# Patient Record
Sex: FEMALE | Race: WHITE | Employment: OTHER | URBAN - METROPOLITAN AREA
[De-identification: names, ages, dates, MRNs, and addresses within clinical notes are randomized per-mention and may not be internally consistent; named-entity substitution may affect disease eponyms.]

---

## 2017-04-03 ENCOUNTER — HOSPITAL ENCOUNTER (OUTPATIENT)
Dept: BONE DENSITY | Facility: HOSPITAL | Age: 77
Discharge: HOME | End: 2017-04-03
Attending: OBSTETRICS & GYNECOLOGY
Payer: MEDICARE

## 2017-04-03 DIAGNOSIS — M85.88 OSTEOPENIA OF OTHER SITE: ICD-10-CM

## 2017-04-03 PROCEDURE — 77080 DXA BONE DENSITY AXIAL: CPT

## 2017-10-24 ENCOUNTER — HOSPITAL ENCOUNTER (OUTPATIENT)
Dept: MAMMOGRAPHY | Facility: HOSPITAL | Age: 77
Discharge: HOME | End: 2017-10-24
Attending: OBSTETRICS & GYNECOLOGY
Payer: MEDICARE

## 2017-10-24 DIAGNOSIS — Z12.31 ENCOUNTER FOR SCREENING MAMMOGRAM FOR MALIGNANT NEOPLASM OF BREAST: ICD-10-CM

## 2017-10-24 PROCEDURE — 77067 SCR MAMMO BI INCL CAD: CPT | Performed by: OBSTETRICS & GYNECOLOGY

## 2019-07-18 ENCOUNTER — HOSPITAL ENCOUNTER (OUTPATIENT)
Dept: BONE DENSITY | Facility: HOSPITAL | Age: 79
Discharge: HOME | End: 2019-07-18
Attending: OBSTETRICS & GYNECOLOGY
Payer: MEDICARE

## 2019-07-18 ENCOUNTER — HOSPITAL ENCOUNTER (OUTPATIENT)
Dept: MAMMOGRAPHY | Facility: HOSPITAL | Age: 79
Discharge: HOME | End: 2019-07-18
Attending: OBSTETRICS & GYNECOLOGY
Payer: MEDICARE

## 2019-07-18 DIAGNOSIS — Z78.0 ASYMPTOMATIC MENOPAUSAL STATE: ICD-10-CM

## 2019-07-18 DIAGNOSIS — Z12.31 ENCOUNTER FOR SCREENING MAMMOGRAM FOR MALIGNANT NEOPLASM OF BREAST: ICD-10-CM

## 2019-07-18 PROCEDURE — 77067 SCR MAMMO BI INCL CAD: CPT | Performed by: OBSTETRICS & GYNECOLOGY

## 2019-07-18 PROCEDURE — 77063 BREAST TOMOSYNTHESIS BI: CPT | Performed by: OBSTETRICS & GYNECOLOGY

## 2019-07-18 PROCEDURE — 77080 DXA BONE DENSITY AXIAL: CPT | Performed by: OBSTETRICS & GYNECOLOGY

## 2020-01-04 ENCOUNTER — APPOINTMENT (OUTPATIENT)
Dept: GENERAL RADIOLOGY | Facility: HOSPITAL | Age: 80
End: 2020-01-04
Payer: MEDICARE

## 2020-01-04 ENCOUNTER — HOSPITAL ENCOUNTER (EMERGENCY)
Facility: HOSPITAL | Age: 80
Discharge: HOME | End: 2020-01-04
Payer: MEDICARE

## 2020-01-04 VITALS
OXYGEN SATURATION: 99 % | HEIGHT: 65 IN | RESPIRATION RATE: 18 BRPM | HEART RATE: 61 BPM | SYSTOLIC BLOOD PRESSURE: 114 MMHG | TEMPERATURE: 98 F | DIASTOLIC BLOOD PRESSURE: 77 MMHG | WEIGHT: 195 LBS | BODY MASS INDEX: 32.49 KG/M2

## 2020-01-04 DIAGNOSIS — J06.9 UPPER RESPIRATORY TRACT INFECTION, UNSPECIFIED TYPE: Primary | ICD-10-CM

## 2020-01-04 LAB — S PYO AG THROAT QL: NEGATIVE

## 2020-01-04 PROCEDURE — 99283 EMERGENCY DEPT VISIT LOW MDM: CPT

## 2020-01-04 PROCEDURE — 87430 STREP A AG IA: CPT

## 2020-01-04 PROCEDURE — 71046 X-RAY EXAM CHEST 2 VIEWS: CPT

## 2020-01-04 NOTE — ED PROVIDER NOTES
Patient Seen in: [facility] AND [facility] Emergency Department    History   Patient presents with:  Sore Throat    Stated Complaint: sore throat    HPI    79yr old female to the ER with sore throat and coughing that started right around [place].   Patient stat 1442]   BP 99/70   Pulse 72   Resp 16   Temp 98.2 °F (36.8 °C)   Temp src Temporal   SpO2 95 %   O2 Device None (Room air)       Current:/77   Pulse 61   Temp 98.2 °F (36.8 °C) (Temporal)   Resp 18   Ht 165.1 cm (5' 5\")   Wt 88.5 kg   SpO2 99%   BMI

## 2020-09-18 ENCOUNTER — HOSPITAL ENCOUNTER (OUTPATIENT)
Dept: MAMMOGRAPHY | Facility: HOSPITAL | Age: 80
Discharge: HOME | End: 2020-09-18
Attending: OBSTETRICS & GYNECOLOGY
Payer: MEDICARE

## 2020-09-18 DIAGNOSIS — Z12.31 ENCOUNTER FOR SCREENING MAMMOGRAM FOR MALIGNANT NEOPLASM OF BREAST: ICD-10-CM

## 2020-09-18 PROCEDURE — 77067 SCR MAMMO BI INCL CAD: CPT | Performed by: OBSTETRICS & GYNECOLOGY

## 2020-09-18 PROCEDURE — 77063 BREAST TOMOSYNTHESIS BI: CPT | Performed by: OBSTETRICS & GYNECOLOGY

## 2021-07-15 ENCOUNTER — HOSPITAL ENCOUNTER (EMERGENCY)
Facility: HOSPITAL | Age: 81
Discharge: HOME | End: 2021-07-15
Attending: EMERGENCY MEDICINE
Payer: MEDICARE

## 2021-07-15 ENCOUNTER — APPOINTMENT (OUTPATIENT)
Dept: NUCLEAR MEDICINE | Facility: HOSPITAL | Age: 81
End: 2021-07-15
Attending: EMERGENCY MEDICINE
Payer: MEDICARE

## 2021-07-15 ENCOUNTER — APPOINTMENT (OUTPATIENT)
Dept: CV DIAGNOSTICS | Facility: HOSPITAL | Age: 81
End: 2021-07-15
Attending: EMERGENCY MEDICINE
Payer: MEDICARE

## 2021-07-15 ENCOUNTER — APPOINTMENT (OUTPATIENT)
Dept: GENERAL RADIOLOGY | Facility: HOSPITAL | Age: 81
End: 2021-07-15
Attending: EMERGENCY MEDICINE
Payer: MEDICARE

## 2021-07-15 VITALS
OXYGEN SATURATION: 93 % | TEMPERATURE: 98 F | WEIGHT: 200 LBS | RESPIRATION RATE: 18 BRPM | BODY MASS INDEX: 33.32 KG/M2 | SYSTOLIC BLOOD PRESSURE: 119 MMHG | DIASTOLIC BLOOD PRESSURE: 72 MMHG | HEIGHT: 65 IN | HEART RATE: 57 BPM

## 2021-07-15 DIAGNOSIS — R07.89 CHEST PAIN, ATYPICAL: Primary | ICD-10-CM

## 2021-07-15 LAB
ANION GAP SERPL CALC-SCNC: 7 MMOL/L (ref 0–18)
BASOPHILS # BLD AUTO: 0.05 X10(3) UL (ref 0–0.2)
BASOPHILS NFR BLD AUTO: 0.7 %
BUN BLD-MCNC: 14 MG/DL (ref 7–18)
BUN/CREAT SERPL: 19.4 (ref 10–20)
CALCIUM BLD-MCNC: 8.9 MG/DL (ref 8.5–10.1)
CHLORIDE SERPL-SCNC: 105 MMOL/L (ref 98–112)
CO2 SERPL-SCNC: 27 MMOL/L (ref 21–32)
CREAT BLD-MCNC: 0.72 MG/DL
DEPRECATED RDW RBC AUTO: 41.6 FL (ref 35.1–46.3)
EOSINOPHIL # BLD AUTO: 0.19 X10(3) UL (ref 0–0.7)
EOSINOPHIL NFR BLD AUTO: 2.6 %
ERYTHROCYTE [DISTWIDTH] IN BLOOD BY AUTOMATED COUNT: 12.5 % (ref 11–15)
GLUCOSE BLD-MCNC: 96 MG/DL (ref 70–99)
HCT VFR BLD AUTO: 42.2 %
HGB BLD-MCNC: 13.8 G/DL
IMM GRANULOCYTES # BLD AUTO: 0.03 X10(3) UL (ref 0–1)
IMM GRANULOCYTES NFR BLD: 0.4 %
LYMPHOCYTES # BLD AUTO: 1.54 X10(3) UL (ref 1–4)
LYMPHOCYTES NFR BLD AUTO: 21.3 %
MCH RBC QN AUTO: 29.3 PG (ref 26–34)
MCHC RBC AUTO-ENTMCNC: 32.7 G/DL (ref 31–37)
MCV RBC AUTO: 89.6 FL
MONOCYTES # BLD AUTO: 0.59 X10(3) UL (ref 0.1–1)
MONOCYTES NFR BLD AUTO: 8.1 %
NEUTROPHILS # BLD AUTO: 4.84 X10 (3) UL (ref 1.5–7.7)
NEUTROPHILS # BLD AUTO: 4.84 X10(3) UL (ref 1.5–7.7)
NEUTROPHILS NFR BLD AUTO: 66.9 %
OSMOLALITY SERPL CALC.SUM OF ELEC: 288 MOSM/KG (ref 275–295)
PLATELET # BLD AUTO: 251 10(3)UL (ref 150–450)
POTASSIUM SERPL-SCNC: 3.7 MMOL/L (ref 3.5–5.1)
RBC # BLD AUTO: 4.71 X10(6)UL
SODIUM SERPL-SCNC: 139 MMOL/L (ref 136–145)
TROPONIN I SERPL-MCNC: <0.045 NG/ML (ref ?–0.04)
WBC # BLD AUTO: 7.2 X10(3) UL (ref 4–11)

## 2021-07-15 PROCEDURE — 93016 CV STRESS TEST SUPVJ ONLY: CPT | Performed by: EMERGENCY MEDICINE

## 2021-07-15 PROCEDURE — 85025 COMPLETE CBC W/AUTO DIFF WBC: CPT | Performed by: EMERGENCY MEDICINE

## 2021-07-15 PROCEDURE — 93017 CV STRESS TEST TRACING ONLY: CPT | Performed by: EMERGENCY MEDICINE

## 2021-07-15 PROCEDURE — 71045 X-RAY EXAM CHEST 1 VIEW: CPT | Performed by: EMERGENCY MEDICINE

## 2021-07-15 PROCEDURE — 93005 ELECTROCARDIOGRAM TRACING: CPT

## 2021-07-15 PROCEDURE — 36415 COLL VENOUS BLD VENIPUNCTURE: CPT

## 2021-07-15 PROCEDURE — 99285 EMERGENCY DEPT VISIT HI MDM: CPT

## 2021-07-15 PROCEDURE — 84484 ASSAY OF TROPONIN QUANT: CPT | Performed by: EMERGENCY MEDICINE

## 2021-07-15 PROCEDURE — 78452 HT MUSCLE IMAGE SPECT MULT: CPT | Performed by: EMERGENCY MEDICINE

## 2021-07-15 PROCEDURE — 93018 CV STRESS TEST I&R ONLY: CPT | Performed by: EMERGENCY MEDICINE

## 2021-07-15 PROCEDURE — 93010 ELECTROCARDIOGRAM REPORT: CPT | Performed by: EMERGENCY MEDICINE

## 2021-07-15 PROCEDURE — 80048 BASIC METABOLIC PNL TOTAL CA: CPT | Performed by: EMERGENCY MEDICINE

## 2021-07-15 NOTE — ED PROVIDER NOTES
Patient Seen in: [facility] AND [facility] Emergency Department      History   Patient presents with:  Chest Pain    Stated Complaint: CP    HPI/Subjective:   HPI    78-year-old female presents for evaluation of chest pain.   Patient reports left-sided nonradiati and negative except as noted above.     Physical Exam     ED Triage Vitals   BP 07/15/21 0929 (!) 190/99   Pulse 07/15/21 0927 61   Resp 07/15/21 0927 18   Temp 07/15/21 0927 98.4 °F (36.9 °C)   Temp src 07/15/21 0927 Oral   SpO2 07/15/21 0927 97 %   O2 Dev Please view results for these tests on the individual orders. RAINBOW DRAW LAVENDER   RAINBOW DRAW LIGHT GREEN   RAINBOW DRAW GOLD   CBC W/ DIFFERENTIAL     EKG    Rate, intervals and axes as noted on EKG Report.   Rate: 58  Rhythm: Sinus Rhythm  Read vitals. MDM      Differential diagnosis includes ACS, PE, musculoskeletal strain, GERD, esophagitis, anxiety    Well-appearing patient, unremarkable stress. Discharged with outpatient follow-up and return precautions.

## 2021-07-15 NOTE — CM/SW NOTE
Able to schedule Lexiscan and spoke to Rosalina Sánchez from [facility] and will be able to get the test completed while the pt is in the ED. No COVID test needed.

## 2021-10-18 ENCOUNTER — HOSPITAL ENCOUNTER (OUTPATIENT)
Dept: MAMMOGRAPHY | Facility: HOSPITAL | Age: 81
Discharge: HOME | End: 2021-10-18
Attending: OBSTETRICS & GYNECOLOGY
Payer: MEDICARE

## 2021-10-18 ENCOUNTER — HOSPITAL ENCOUNTER (OUTPATIENT)
Dept: BONE DENSITY | Facility: HOSPITAL | Age: 81
Discharge: HOME | End: 2021-10-18
Attending: OBSTETRICS & GYNECOLOGY
Payer: MEDICARE

## 2021-10-18 DIAGNOSIS — Z12.31 ENCOUNTER FOR SCREENING MAMMOGRAM FOR MALIGNANT NEOPLASM OF BREAST: ICD-10-CM

## 2021-10-18 DIAGNOSIS — Z78.0 ASYMPTOMATIC MENOPAUSAL STATE: ICD-10-CM

## 2021-10-18 PROCEDURE — 77080 DXA BONE DENSITY AXIAL: CPT | Performed by: OBSTETRICS & GYNECOLOGY

## 2021-10-18 PROCEDURE — 77067 SCR MAMMO BI INCL CAD: CPT | Performed by: OBSTETRICS & GYNECOLOGY

## 2021-10-18 PROCEDURE — 77063 BREAST TOMOSYNTHESIS BI: CPT | Performed by: OBSTETRICS & GYNECOLOGY

## 2022-11-16 ENCOUNTER — EXTERNAL RECORD (OUTPATIENT)
Dept: HEALTH INFORMATION MANAGEMENT | Facility: OTHER | Age: 82
End: 2022-11-16

## 2022-11-16 ENCOUNTER — HOSPITAL ENCOUNTER (OUTPATIENT)
Dept: MAMMOGRAPHY | Facility: HOSPITAL | Age: 82
Discharge: HOME | End: 2022-11-16
Attending: OBSTETRICS & GYNECOLOGY
Payer: MEDICARE

## 2022-11-16 DIAGNOSIS — Z12.31 ENCOUNTER FOR SCREENING MAMMOGRAM FOR MALIGNANT NEOPLASM OF BREAST: ICD-10-CM

## 2022-11-16 PROCEDURE — 77063 BREAST TOMOSYNTHESIS BI: CPT | Performed by: OBSTETRICS & GYNECOLOGY

## 2022-11-16 PROCEDURE — 77067 SCR MAMMO BI INCL CAD: CPT | Performed by: OBSTETRICS & GYNECOLOGY

## 2022-12-07 RX ORDER — AMLODIPINE BESYLATE 5 MG/1
10 TABLET ORAL DAILY
COMMUNITY

## 2022-12-07 RX ORDER — CLOBETASOL PROPIONATE 0.5 MG/G
OINTMENT TOPICAL
COMMUNITY

## 2022-12-07 RX ORDER — LOSARTAN POTASSIUM 100 MG/1
100 TABLET ORAL DAILY
COMMUNITY

## 2022-12-07 RX ORDER — BISOPROLOL FUMARATE AND HYDROCHLOROTHIAZIDE 5; 6.25 MG/1; MG/1
1 TABLET ORAL DAILY
COMMUNITY

## 2023-01-17 ENCOUNTER — OFFICE VISIT (OUTPATIENT)
Dept: OBGYN | Age: 83
End: 2023-01-17

## 2023-01-17 VITALS
TEMPERATURE: 98 F | HEIGHT: 65 IN | OXYGEN SATURATION: 98 % | HEART RATE: 56 BPM | WEIGHT: 197.6 LBS | DIASTOLIC BLOOD PRESSURE: 77 MMHG | BODY MASS INDEX: 32.92 KG/M2 | SYSTOLIC BLOOD PRESSURE: 121 MMHG

## 2023-01-17 DIAGNOSIS — N90.4 LICHEN SCLEROSUS ET ATROPHICUS OF THE VULVA: Primary | ICD-10-CM

## 2023-01-17 PROBLEM — N39.3 STRESS INCONTINENCE, FEMALE: Status: ACTIVE | Noted: 2023-01-17

## 2023-01-17 PROBLEM — M85.80 OSTEOPENIA: Status: ACTIVE | Noted: 2023-01-17

## 2023-01-17 PROCEDURE — 99213 OFFICE O/P EST LOW 20 MIN: CPT | Performed by: OBSTETRICS & GYNECOLOGY

## 2023-01-17 RX ORDER — CETIRIZINE HYDROCHLORIDE 5 MG/1
1 TABLET ORAL DAILY
COMMUNITY
Start: 2022-09-12

## 2023-01-17 RX ORDER — MOMETASONE FUROATE 1 MG/ML
SOLUTION TOPICAL
COMMUNITY
Start: 2022-09-23

## 2023-01-17 ASSESSMENT — PATIENT HEALTH QUESTIONNAIRE - PHQ9
2. FEELING DOWN, DEPRESSED OR HOPELESS: NOT AT ALL
SUM OF ALL RESPONSES TO PHQ9 QUESTIONS 1 AND 2: 0
SUM OF ALL RESPONSES TO PHQ9 QUESTIONS 1 AND 2: 0
1. LITTLE INTEREST OR PLEASURE IN DOING THINGS: NOT AT ALL
CLINICAL INTERPRETATION OF PHQ2 SCORE: NO FURTHER SCREENING NEEDED

## 2023-11-15 ENCOUNTER — ORDER TRANSCRIPTION (OUTPATIENT)
Dept: ADMINISTRATIVE | Facility: HOSPITAL | Age: 83
End: 2023-11-15

## 2023-11-15 DIAGNOSIS — Z12.31 ENCOUNTER FOR SCREENING MAMMOGRAM FOR MALIGNANT NEOPLASM OF BREAST: Primary | ICD-10-CM

## 2024-01-20 ENCOUNTER — TELEPHONE (OUTPATIENT)
Dept: OBGYN | Age: 84
End: 2024-01-20

## 2024-01-20 DIAGNOSIS — N39.0 URINARY TRACT INFECTION WITHOUT HEMATURIA, SITE UNSPECIFIED: Primary | ICD-10-CM

## 2024-01-20 RX ORDER — NITROFURANTOIN 25; 75 MG/1; MG/1
100 CAPSULE ORAL 2 TIMES DAILY
Qty: 14 CAPSULE | Refills: 0 | Status: SHIPPED | OUTPATIENT
Start: 2024-01-20 | End: 2024-01-27

## 2024-01-25 ENCOUNTER — APPOINTMENT (OUTPATIENT)
Dept: CT IMAGING | Facility: HOSPITAL | Age: 84
End: 2024-01-25
Attending: EMERGENCY MEDICINE
Payer: MEDICARE

## 2024-01-25 ENCOUNTER — HOSPITAL ENCOUNTER (EMERGENCY)
Facility: HOSPITAL | Age: 84
Discharge: HOME | End: 2024-01-25
Attending: EMERGENCY MEDICINE
Payer: MEDICARE

## 2024-01-25 VITALS
SYSTOLIC BLOOD PRESSURE: 143 MMHG | HEART RATE: 88 BPM | WEIGHT: 195 LBS | TEMPERATURE: 98 F | OXYGEN SATURATION: 98 % | BODY MASS INDEX: 32.49 KG/M2 | RESPIRATION RATE: 20 BRPM | HEIGHT: 65 IN | DIASTOLIC BLOOD PRESSURE: 88 MMHG

## 2024-01-25 DIAGNOSIS — R10.31 ABDOMINAL PAIN, RIGHT LOWER QUADRANT: ICD-10-CM

## 2024-01-25 DIAGNOSIS — N30.00 ACUTE CYSTITIS WITHOUT HEMATURIA: Primary | ICD-10-CM

## 2024-01-25 LAB
ALBUMIN SERPL-MCNC: 4.1 G/DL (ref 3.2–4.8)
ALP LIVER SERPL-CCNC: 92 U/L
ALT SERPL-CCNC: 14 U/L
ANION GAP SERPL CALC-SCNC: 8 MMOL/L (ref 0–18)
AST SERPL-CCNC: 16 U/L (ref ?–34)
BASOPHILS # BLD AUTO: 0.05 X10(3) UL (ref 0–0.2)
BASOPHILS NFR BLD AUTO: 0.6 %
BILIRUB DIRECT SERPL-MCNC: 0.3 MG/DL (ref ?–0.3)
BILIRUB SERPL-MCNC: 0.8 MG/DL (ref 0.2–1.1)
BILIRUB UR QL: NEGATIVE
BUN BLD-MCNC: 15 MG/DL (ref 9–23)
BUN/CREAT SERPL: 19.7 (ref 10–20)
CALCIUM BLD-MCNC: 9.3 MG/DL (ref 8.7–10.4)
CHLORIDE SERPL-SCNC: 101 MMOL/L (ref 98–112)
CLARITY UR: CLEAR
CO2 SERPL-SCNC: 26 MMOL/L (ref 21–32)
COLOR UR: YELLOW
CREAT BLD-MCNC: 0.76 MG/DL
DEPRECATED RDW RBC AUTO: 39.6 FL (ref 35.1–46.3)
EGFRCR SERPLBLD CKD-EPI 2021: 78 ML/MIN/1.73M2 (ref 60–?)
EOSINOPHIL # BLD AUTO: 0.11 X10(3) UL (ref 0–0.7)
EOSINOPHIL NFR BLD AUTO: 1.3 %
ERYTHROCYTE [DISTWIDTH] IN BLOOD BY AUTOMATED COUNT: 12.7 % (ref 11–15)
GLUCOSE BLD-MCNC: 97 MG/DL (ref 70–99)
GLUCOSE UR-MCNC: NORMAL MG/DL
HCT VFR BLD AUTO: 42.6 %
HGB BLD-MCNC: 14.1 G/DL
IMM GRANULOCYTES # BLD AUTO: 0.02 X10(3) UL (ref 0–1)
IMM GRANULOCYTES NFR BLD: 0.2 %
KETONES UR-MCNC: NEGATIVE MG/DL
LEUKOCYTE ESTERASE UR QL STRIP.AUTO: 250
LYMPHOCYTES # BLD AUTO: 1.45 X10(3) UL (ref 1–4)
LYMPHOCYTES NFR BLD AUTO: 16.7 %
MCH RBC QN AUTO: 28.4 PG (ref 26–34)
MCHC RBC AUTO-ENTMCNC: 33.1 G/DL (ref 31–37)
MCV RBC AUTO: 85.7 FL
MONOCYTES # BLD AUTO: 0.8 X10(3) UL (ref 0.1–1)
MONOCYTES NFR BLD AUTO: 9.2 %
NEUTROPHILS # BLD AUTO: 6.26 X10 (3) UL (ref 1.5–7.7)
NEUTROPHILS # BLD AUTO: 6.26 X10(3) UL (ref 1.5–7.7)
NEUTROPHILS NFR BLD AUTO: 72 %
NITRITE UR QL STRIP.AUTO: NEGATIVE
OSMOLALITY SERPL CALC.SUM OF ELEC: 281 MOSM/KG (ref 275–295)
PH UR: 6 [PH] (ref 5–8)
PLATELET # BLD AUTO: 245 10(3)UL (ref 150–450)
POTASSIUM SERPL-SCNC: 4 MMOL/L (ref 3.5–5.1)
PROT SERPL-MCNC: 7.1 G/DL (ref 5.7–8.2)
PROT UR-MCNC: 20 MG/DL
RBC # BLD AUTO: 4.97 X10(6)UL
SODIUM SERPL-SCNC: 135 MMOL/L (ref 136–145)
SP GR UR STRIP: 1.02 (ref 1–1.03)
UROBILINOGEN UR STRIP-ACNC: NORMAL
WBC # BLD AUTO: 8.7 X10(3) UL (ref 4–11)

## 2024-01-25 PROCEDURE — 80048 BASIC METABOLIC PNL TOTAL CA: CPT | Performed by: EMERGENCY MEDICINE

## 2024-01-25 PROCEDURE — 81001 URINALYSIS AUTO W/SCOPE: CPT | Performed by: EMERGENCY MEDICINE

## 2024-01-25 PROCEDURE — 87086 URINE CULTURE/COLONY COUNT: CPT | Performed by: EMERGENCY MEDICINE

## 2024-01-25 PROCEDURE — 80076 HEPATIC FUNCTION PANEL: CPT | Performed by: EMERGENCY MEDICINE

## 2024-01-25 PROCEDURE — 85025 COMPLETE CBC W/AUTO DIFF WBC: CPT | Performed by: EMERGENCY MEDICINE

## 2024-01-25 PROCEDURE — 99285 EMERGENCY DEPT VISIT HI MDM: CPT

## 2024-01-25 PROCEDURE — 74177 CT ABD & PELVIS W/CONTRAST: CPT | Performed by: EMERGENCY MEDICINE

## 2024-01-25 PROCEDURE — 96374 THER/PROPH/DIAG INJ IV PUSH: CPT

## 2024-01-25 PROCEDURE — 96361 HYDRATE IV INFUSION ADD-ON: CPT

## 2024-01-25 RX ORDER — CEPHALEXIN 500 MG/1
500 CAPSULE ORAL 2 TIMES DAILY
Qty: 10 CAPSULE | Refills: 0 | Status: SHIPPED | OUTPATIENT
Start: 2024-01-25 | End: 2024-01-30

## 2024-01-25 RX ORDER — MORPHINE SULFATE 4 MG/ML
4 INJECTION, SOLUTION INTRAMUSCULAR; INTRAVENOUS ONCE
Status: COMPLETED | OUTPATIENT
Start: 2024-01-25 | End: 2024-01-25

## 2024-01-25 NOTE — ED INITIAL ASSESSMENT (HPI)
Patient from home with c/o RLQ abdominal that began yesterday. Denies fever, nausea, vomiting, diarrhea. Currently being treated for a UTI.

## 2024-01-25 NOTE — ED PROVIDER NOTES
Patient Seen in: [facility] Emergency Department    History     Chief Complaint   Patient presents with    Abdomen/Flank Pain       HPI    83-year-old female with past medical history significant for high blood pressure, high cholesterol, presents to the ED for evaluation of right lower quadrant abdominal pain that started yesterday.  Patient states that she just completed antibiotics for urinary tract infection today.  Yesterday she developed some mild right lower quadrant pain that seems of gotten worse.  No nausea, vomiting, diarrhea, fevers, chills, dysuria.    History reviewed.   Past Medical History:   Diagnosis Date    Essential hypertension     Hyperlipidemia        History reviewed.   Past Surgical History:   Procedure Laterality Date    COLONOSCOPY,BIOPSY  2012    Procedure: COLONOSCOPY, POSSIBLE BIOPSY, POSSIBLE POLYPECTOMY 00107;  Surgeon: Jan Snyder MD;  Location: [facility]    PATIENT DOCUMENTED NOT TO HAVE EXPERIENCED ANY OF THE FOLLOWING EVENTS  2012    Procedure: ESOPHAGOGASTRODUODENOSCOPY, COLONOSCOPY, POSSIBLE BIOPSY, POSSIBLE POLYPECTOMY 53737,18970;  Surgeon: Jan Snyder MD;  Location: [facility]    PATIENT WITHOUGH PREOPERATIVE ORDER FOR IV ANTIBIOTIC SURGICAL SITE INFECTION PROPHYLAXIS.  2012    Procedure: ESOPHAGOGASTRODUODENOSCOPY, COLONOSCOPY, POSSIBLE BIOPSY, POSSIBLE POLYPECTOMY 99635,80978;  Surgeon: Jan Snyder MD;  Location: [facility]         Medications :  (Not in a hospital admission)       No family history on file.    Smoking Status:   Social History     Socioeconomic History    Marital status:    Tobacco Use    Smoking status: Former     Packs/day: 0.50     Years: 20.00     Additional pack years: 0.00     Total pack years: 10.00     Types: Cigarettes     Quit date: 10/10/2007     Years since quittin.3    Smokeless tobacco: Never   Vaping Use    Vaping Use: Never used   Substance and Sexual Activity     Alcohol use: Not Currently    Drug use: Never       Constitutional and vital signs reviewed.      Social History and Family History elements reviewed from today, pertinent positives to the presenting problem noted.    Physical Exam     ED Triage Vitals [01/25/24 0617]   /88   Pulse 88   Resp 20   Temp 98.1 °F (36.7 °C)   Temp src Oral   SpO2 98 %   O2 Device None (Room air)       Physical Exam   Constitutional: AAOx3, well nourished, NAD  HEENT: Normocephalic, PERRLA, MMM  CV: s1s2+, RRR, no m/r/g, normal distal pulses  Pulmonary/Chest: CTA b/l with no rales, wheezes.  No chest wall tenderness  Abdominal: Mild right lower quadrant tenderness without rebound or guarding.  Nondistended. Soft. Bowel sounds are normal.   Neck/Back:   :   Musculoskeletal: Normal range of motion. No deformity.   Neurological: Awake, alert. Normal reflexes. No cranial nerve deficit.    Skin: Skin is warm and dry. No rash noted. No erythema.   Psychiatric:      All measures to prevent infection transmission during my interaction with the patient were taken. The patient was already wearing a droplet mask on my arrival to the room. Personal protective equipment was worn throughout the duration of the exam.      ED Course        Labs Reviewed   BASIC METABOLIC PANEL (8) - Abnormal; Notable for the following components:       Result Value    Sodium 135 (*)     All other components within normal limits   URINALYSIS WITH CULTURE REFLEX - Abnormal; Notable for the following components:    Blood Urine 1+ (*)     Protein Urine 20 (*)     Leukocyte Esterase Urine 250 (*)     WBC Urine 6-10 (*)     RBC Urine 6-10 (*)     Squamous Epi. Cells Few (*)     All other components within normal limits   HEPATIC FUNCTION PANEL (7) - Normal   CBC WITH DIFFERENTIAL WITH PLATELET    Narrative:     The following orders were created for panel order CBC With Differential With Platelet.                  Procedure                               Abnormality          Status                                     ---------                               -----------         ------                                     CBC W/ DIFFERENTIAL[[id number]]                              Final result                                                 Please view results for these tests on the individual orders.   URINE CULTURE, ROUTINE   CBC W/ DIFFERENTIAL     My Independent Interpretation of EKG (if performed):     Monitor Interpretation:   normal sinus rhythm as interpreted by me.      Imaging Results Available and Reviewed while in ED: CT ABDOMEN PELVIS IV CONTRAST, NO ORAL (ER)    Result Date: 1/25/2024  CONCLUSION:   Diverticulosis without diverticulitis.  Hepatic steatosis  Normal appendix  Multiple other incidental findings as described in the body of the report.     Dictated by (CST): Torin Diaz MD on 1/25/2024 at 10:06 AM     Finalized by (CST): Torin Diaz MD on 1/25/2024 at 10:13 AM         ED Medications Administered:   Medications   sodium chloride 0.9 % IV bolus 1,000 mL (0 mL Intravenous Stopped 1/25/24 0930)   morphINE PF 4 MG/ML injection 4 mg (4 mg Intravenous Given 1/25/24 0857)   iopamidol 76% (ISOVUE-370) injection for power injector (80 mL Intravenous Given 1/25/24 0956)             MDM     Vitals:    01/25/24 0617   BP: 143/88   Pulse: 88   Resp: 20   Temp: 98.1 °F (36.7 °C)   TempSrc: Oral   SpO2: 98%   Weight: 88.5 kg   Height: 165.1 cm (5' 5\")     *I personally reviewed and interpreted all ED vitals.    Pulse Ox: 98%, Room air, Normal     Independent Interpretation of Studies: Independently reviewed patient's CT scan of the abdomen pelvis.  Patient does not appear to have acute appendicitis.  There are no other significant acute findings.    History from Independent Source:       External Records Reviewed: Reviewed external records and patient was placed on 7 days of Macrobid for UTI by her primary care physician last week    Social Determinants of Health:      Procedures:      Differential/MDM/Shared Decision Making: Differential diagnosis includes UTI, pyelonephritis, appendicitis, kidney stone, ischemic bowel, ovarian cyst, others.      The patient already has blood pressure, high cholesterol, recently treated urinary tract infection, to contribute to the complexity of this ED evaluation.    ED is remarkable for persistent mild urinary tract infection.  Other labs and CT of the abdomen pelvis are relatively unremarkable.  Will place patient on Keflex at this time.  Discussed case with patient and she is comfortable with discharge plan.      Condition upon leaving the department: Stable    Disposition and Plan     Clinical Impression:  1. Acute cystitis without hematuria    2. Abdominal pain, right lower quadrant        Disposition:  Discharge    Follow-up:  Qiana Markham MD  [address] [address] [phone]    Call in 2 day(s)        Medications Prescribed:  Current Discharge Medication List        START taking these medications    Details   cephalexin 500 MG Oral Cap Take 1 capsule (500 mg total) by mouth 2 (two) times daily for 5 days.  Qty: 10 capsule, Refills: 0

## 2024-01-29 ENCOUNTER — HOSPITAL ENCOUNTER (OUTPATIENT)
Dept: MAMMOGRAPHY | Facility: HOSPITAL | Age: 84
Discharge: HOME | End: 2024-01-29
Attending: OBSTETRICS & GYNECOLOGY
Payer: MEDICARE

## 2024-01-29 DIAGNOSIS — Z12.31 ENCOUNTER FOR SCREENING MAMMOGRAM FOR MALIGNANT NEOPLASM OF BREAST: ICD-10-CM

## 2024-01-29 PROCEDURE — 77063 BREAST TOMOSYNTHESIS BI: CPT | Performed by: OBSTETRICS & GYNECOLOGY

## 2024-01-29 PROCEDURE — 77067 SCR MAMMO BI INCL CAD: CPT | Performed by: OBSTETRICS & GYNECOLOGY

## 2024-01-31 ENCOUNTER — APPOINTMENT (OUTPATIENT)
Dept: OBGYN | Age: 84
End: 2024-01-31

## 2024-01-31 VITALS
HEIGHT: 65 IN | DIASTOLIC BLOOD PRESSURE: 82 MMHG | OXYGEN SATURATION: 98 % | HEART RATE: 61 BPM | TEMPERATURE: 98 F | WEIGHT: 202.49 LBS | SYSTOLIC BLOOD PRESSURE: 130 MMHG | BODY MASS INDEX: 33.74 KG/M2

## 2024-01-31 DIAGNOSIS — N39.0 URINARY TRACT INFECTION WITHOUT HEMATURIA, SITE UNSPECIFIED: ICD-10-CM

## 2024-01-31 DIAGNOSIS — N39.3 STRESS INCONTINENCE, FEMALE: ICD-10-CM

## 2024-01-31 DIAGNOSIS — M85.852 OSTEOPENIA OF NECK OF LEFT FEMUR: ICD-10-CM

## 2024-01-31 DIAGNOSIS — N90.4 LICHEN SCLEROSUS ET ATROPHICUS OF THE VULVA: Primary | ICD-10-CM

## 2024-01-31 RX ORDER — CLOBETASOL PROPIONATE 0.5 MG/G
OINTMENT TOPICAL
Qty: 30 G | Refills: 3 | Status: SHIPPED | OUTPATIENT
Start: 2024-02-01

## 2024-05-28 ENCOUNTER — TELEPHONE (OUTPATIENT)
Dept: OBGYN | Age: 84
End: 2024-05-28

## 2024-05-28 RX ORDER — BETAMETHASONE DIPROPIONATE 0.5 MG/G
CREAM TOPICAL
Qty: 30 G | Refills: 5 | Status: SHIPPED | OUTPATIENT
Start: 2024-05-28

## 2024-09-14 ENCOUNTER — HOSPITAL ENCOUNTER (INPATIENT)
Facility: HOSPITAL | Age: 84
LOS: 3 days | Discharge: HOME | End: 2024-09-17
Attending: EMERGENCY MEDICINE | Admitting: INTERNAL MEDICINE
Payer: MEDICARE

## 2024-09-14 ENCOUNTER — APPOINTMENT (OUTPATIENT)
Dept: GENERAL RADIOLOGY | Facility: HOSPITAL | Age: 84
End: 2024-09-14
Attending: EMERGENCY MEDICINE
Payer: MEDICARE

## 2024-09-14 ENCOUNTER — APPOINTMENT (OUTPATIENT)
Dept: CT IMAGING | Facility: HOSPITAL | Age: 84
End: 2024-09-14
Attending: EMERGENCY MEDICINE
Payer: MEDICARE

## 2024-09-14 DIAGNOSIS — I48.92 ATRIAL FIBRILLATION AND FLUTTER (HCC): ICD-10-CM

## 2024-09-14 DIAGNOSIS — I48.91 ATRIAL FIBRILLATION AND FLUTTER (HCC): ICD-10-CM

## 2024-09-14 DIAGNOSIS — E87.1 HYPONATREMIA: ICD-10-CM

## 2024-09-14 DIAGNOSIS — I21.4 NSTEMI (NON-ST ELEVATED MYOCARDIAL INFARCTION) (HCC): Primary | ICD-10-CM

## 2024-09-14 LAB
ANION GAP SERPL CALC-SCNC: 8 MMOL/L (ref 0–18)
APTT PPP: 29.3 SECONDS (ref 23–36)
BASOPHILS # BLD AUTO: 0.05 X10(3) UL (ref 0–0.2)
BASOPHILS NFR BLD AUTO: 0.6 %
BUN BLD-MCNC: 15 MG/DL (ref 9–23)
BUN/CREAT SERPL: 17.4 (ref 10–20)
CALCIUM BLD-MCNC: 9.7 MG/DL (ref 8.7–10.4)
CHLORIDE SERPL-SCNC: 103 MMOL/L (ref 98–112)
CHOLEST SERPL-MCNC: 186 MG/DL (ref ?–200)
CO2 SERPL-SCNC: 26 MMOL/L (ref 21–32)
CREAT BLD-MCNC: 0.86 MG/DL
D DIMER PPP FEU-MCNC: 1.03 UG/ML FEU (ref ?–0.82)
DEPRECATED RDW RBC AUTO: 41.3 FL (ref 35.1–46.3)
EGFRCR SERPLBLD CKD-EPI 2021: 67 ML/MIN/1.73M2 (ref 60–?)
EOSINOPHIL # BLD AUTO: 0.13 X10(3) UL (ref 0–0.7)
EOSINOPHIL NFR BLD AUTO: 1.7 %
ERYTHROCYTE [DISTWIDTH] IN BLOOD BY AUTOMATED COUNT: 12.6 % (ref 11–15)
GLUCOSE BLD-MCNC: 104 MG/DL (ref 70–99)
HCT VFR BLD AUTO: 45.2 %
HDLC SERPL-MCNC: 41 MG/DL (ref 40–59)
HGB BLD-MCNC: 14.9 G/DL
IMM GRANULOCYTES # BLD AUTO: 0.03 X10(3) UL (ref 0–1)
IMM GRANULOCYTES NFR BLD: 0.4 %
INR BLD: 1 (ref 0.8–1.2)
LDLC SERPL CALC-MCNC: 123 MG/DL (ref ?–100)
LYMPHOCYTES # BLD AUTO: 1.39 X10(3) UL (ref 1–4)
LYMPHOCYTES NFR BLD AUTO: 17.8 %
MCH RBC QN AUTO: 29.2 PG (ref 26–34)
MCHC RBC AUTO-ENTMCNC: 33 G/DL (ref 31–37)
MCV RBC AUTO: 88.5 FL
MONOCYTES # BLD AUTO: 0.66 X10(3) UL (ref 0.1–1)
MONOCYTES NFR BLD AUTO: 8.5 %
NEUTROPHILS # BLD AUTO: 5.54 X10 (3) UL (ref 1.5–7.7)
NEUTROPHILS # BLD AUTO: 5.54 X10(3) UL (ref 1.5–7.7)
NEUTROPHILS NFR BLD AUTO: 71 %
NONHDLC SERPL-MCNC: 145 MG/DL (ref ?–130)
OSMOLALITY SERPL CALC.SUM OF ELEC: 285 MOSM/KG (ref 275–295)
PLATELET # BLD AUTO: 231 10(3)UL (ref 150–450)
POTASSIUM SERPL-SCNC: 3.9 MMOL/L (ref 3.5–5.1)
PROTHROMBIN TIME: 13.8 SECONDS (ref 11.6–14.8)
RBC # BLD AUTO: 5.11 X10(6)UL
SODIUM SERPL-SCNC: 137 MMOL/L (ref 136–145)
TRIGL SERPL-MCNC: 119 MG/DL (ref 30–149)
TROPONIN I SERPL HS-MCNC: 1706 NG/L
TROPONIN I SERPL HS-MCNC: 916 NG/L
VLDLC SERPL CALC-MCNC: 21 MG/DL (ref 0–30)
WBC # BLD AUTO: 7.8 X10(3) UL (ref 4–11)

## 2024-09-14 PROCEDURE — 71045 X-RAY EXAM CHEST 1 VIEW: CPT | Performed by: EMERGENCY MEDICINE

## 2024-09-14 PROCEDURE — 99223 1ST HOSP IP/OBS HIGH 75: CPT | Performed by: INTERNAL MEDICINE

## 2024-09-14 PROCEDURE — 71260 CT THORAX DX C+: CPT | Performed by: EMERGENCY MEDICINE

## 2024-09-14 RX ORDER — MORPHINE SULFATE 2 MG/ML
2 INJECTION, SOLUTION INTRAMUSCULAR; INTRAVENOUS EVERY 2 HOUR PRN
Status: DISCONTINUED | OUTPATIENT
Start: 2024-09-14 | End: 2024-09-15 | Stop reason: ALTCHOICE

## 2024-09-14 RX ORDER — NITROGLYCERIN 0.4 MG/1
0.4 TABLET SUBLINGUAL EVERY 5 MIN PRN
Status: DISCONTINUED | OUTPATIENT
Start: 2024-09-14 | End: 2024-09-17

## 2024-09-14 RX ORDER — HYDROCODONE BITARTRATE AND ACETAMINOPHEN 5; 325 MG/1; MG/1
1 TABLET ORAL EVERY 4 HOURS PRN
Status: DISCONTINUED | OUTPATIENT
Start: 2024-09-14 | End: 2024-09-17

## 2024-09-14 RX ORDER — MORPHINE SULFATE 2 MG/ML
1 INJECTION, SOLUTION INTRAMUSCULAR; INTRAVENOUS EVERY 2 HOUR PRN
Status: DISCONTINUED | OUTPATIENT
Start: 2024-09-14 | End: 2024-09-15 | Stop reason: ALTCHOICE

## 2024-09-14 RX ORDER — ROSUVASTATIN CALCIUM 20 MG/1
20 TABLET, COATED ORAL
Status: DISCONTINUED | OUTPATIENT
Start: 2024-09-15 | End: 2024-09-15

## 2024-09-14 RX ORDER — HYDRALAZINE HYDROCHLORIDE 20 MG/ML
10 INJECTION INTRAMUSCULAR; INTRAVENOUS EVERY 6 HOURS PRN
Status: DISCONTINUED | OUTPATIENT
Start: 2024-09-14 | End: 2024-09-17

## 2024-09-14 RX ORDER — MELATONIN
3 NIGHTLY PRN
Status: DISCONTINUED | OUTPATIENT
Start: 2024-09-14 | End: 2024-09-15

## 2024-09-14 RX ORDER — LOSARTAN POTASSIUM 100 MG/1
100 TABLET ORAL DAILY
Status: DISCONTINUED | OUTPATIENT
Start: 2024-09-15 | End: 2024-09-17

## 2024-09-14 RX ORDER — AMLODIPINE BESYLATE 5 MG/1
10 TABLET ORAL DAILY
Status: DISCONTINUED | OUTPATIENT
Start: 2024-09-15 | End: 2024-09-17

## 2024-09-14 RX ORDER — MORPHINE SULFATE 4 MG/ML
4 INJECTION, SOLUTION INTRAMUSCULAR; INTRAVENOUS EVERY 2 HOUR PRN
Status: DISCONTINUED | OUTPATIENT
Start: 2024-09-14 | End: 2024-09-15

## 2024-09-14 RX ORDER — ONDANSETRON 2 MG/ML
4 INJECTION INTRAMUSCULAR; INTRAVENOUS EVERY 6 HOURS PRN
Status: DISCONTINUED | OUTPATIENT
Start: 2024-09-14 | End: 2024-09-17

## 2024-09-14 RX ORDER — HYDROCODONE BITARTRATE AND ACETAMINOPHEN 5; 325 MG/1; MG/1
2 TABLET ORAL EVERY 4 HOURS PRN
Status: DISCONTINUED | OUTPATIENT
Start: 2024-09-14 | End: 2024-09-17

## 2024-09-14 RX ORDER — CALCIUM CARBONATE/VITAMIN D3 500 MG-10
1 TABLET ORAL DAILY
COMMUNITY

## 2024-09-14 RX ORDER — HEPARIN SODIUM AND DEXTROSE 10000; 5 [USP'U]/100ML; G/100ML
1000 INJECTION INTRAVENOUS ONCE
Status: COMPLETED | OUTPATIENT
Start: 2024-09-14 | End: 2024-09-15

## 2024-09-14 RX ORDER — ACETAMINOPHEN 500 MG
500 TABLET ORAL EVERY 4 HOURS PRN
Status: DISCONTINUED | OUTPATIENT
Start: 2024-09-14 | End: 2024-09-17

## 2024-09-14 RX ORDER — HYDROCHLOROTHIAZIDE 12.5 MG/1
6.25 TABLET ORAL
Status: DISCONTINUED | OUTPATIENT
Start: 2024-09-15 | End: 2024-09-17

## 2024-09-14 RX ORDER — ACETAMINOPHEN 325 MG/1
650 TABLET ORAL EVERY 4 HOURS PRN
Status: DISCONTINUED | OUTPATIENT
Start: 2024-09-14 | End: 2024-09-17

## 2024-09-14 RX ORDER — HEPARIN SODIUM 1000 [USP'U]/ML
5000 INJECTION, SOLUTION INTRAVENOUS; SUBCUTANEOUS ONCE
Status: COMPLETED | OUTPATIENT
Start: 2024-09-14 | End: 2024-09-14

## 2024-09-14 RX ORDER — METOPROLOL TARTRATE 50 MG
50 TABLET ORAL
Status: DISCONTINUED | OUTPATIENT
Start: 2024-09-14 | End: 2024-09-17

## 2024-09-14 RX ORDER — METOCLOPRAMIDE HYDROCHLORIDE 5 MG/ML
5 INJECTION INTRAMUSCULAR; INTRAVENOUS EVERY 8 HOURS PRN
Status: DISCONTINUED | OUTPATIENT
Start: 2024-09-14 | End: 2024-09-17

## 2024-09-14 RX ORDER — HEPARIN SODIUM AND DEXTROSE 10000; 5 [USP'U]/100ML; G/100ML
INJECTION INTRAVENOUS CONTINUOUS
Status: DISCONTINUED | OUTPATIENT
Start: 2024-09-15 | End: 2024-09-17

## 2024-09-14 RX ORDER — ASPIRIN 325 MG
325 TABLET ORAL DAILY
Status: COMPLETED | OUTPATIENT
Start: 2024-09-15 | End: 2024-09-15

## 2024-09-15 ENCOUNTER — APPOINTMENT (OUTPATIENT)
Dept: CV DIAGNOSTICS | Facility: HOSPITAL | Age: 84
End: 2024-09-15
Attending: INTERNAL MEDICINE
Payer: MEDICARE

## 2024-09-15 LAB
ANION GAP SERPL CALC-SCNC: 8 MMOL/L (ref 0–18)
APTT PPP: 53.4 SECONDS (ref 23–36)
APTT PPP: 74.4 SECONDS (ref 23–36)
ATRIAL RATE: 117 BPM
BUN BLD-MCNC: 11 MG/DL (ref 9–23)
BUN/CREAT SERPL: 13.8 (ref 10–20)
CALCIUM BLD-MCNC: 9 MG/DL (ref 8.7–10.4)
CHLORIDE SERPL-SCNC: 103 MMOL/L (ref 98–112)
CO2 SERPL-SCNC: 26 MMOL/L (ref 21–32)
CREAT BLD-MCNC: 0.8 MG/DL
EGFRCR SERPLBLD CKD-EPI 2021: 74 ML/MIN/1.73M2 (ref 60–?)
EST. AVERAGE GLUCOSE BLD GHB EST-MCNC: 123 MG/DL (ref 68–126)
GLUCOSE BLD-MCNC: 103 MG/DL (ref 70–99)
HBA1C MFR BLD: 5.9 % (ref ?–5.7)
MAGNESIUM SERPL-MCNC: 2 MG/DL (ref 1.6–2.6)
OSMOLALITY SERPL CALC.SUM OF ELEC: 284 MOSM/KG (ref 275–295)
POTASSIUM SERPL-SCNC: 3.5 MMOL/L (ref 3.5–5.1)
POTASSIUM SERPL-SCNC: 3.8 MMOL/L (ref 3.5–5.1)
Q-T INTERVAL: 436 MS
Q-T INTERVAL: 450 MS
QRS DURATION: 82 MS
QRS DURATION: 86 MS
QTC CALCULATION (BEZET): 460 MS
QTC CALCULATION (BEZET): 471 MS
R AXIS: 58 DEGREES
R AXIS: 67 DEGREES
SODIUM SERPL-SCNC: 137 MMOL/L (ref 136–145)
T AXIS: -2 DEGREES
T AXIS: -24 DEGREES
TROPONIN I SERPL HS-MCNC: 2741 NG/L
TROPONIN I SERPL HS-MCNC: 4465 NG/L
TROPONIN I SERPL HS-MCNC: 4930 NG/L
TSI SER-ACNC: 2.55 MIU/ML (ref 0.55–4.78)
VENTRICULAR RATE: 66 BPM
VENTRICULAR RATE: 67 BPM

## 2024-09-15 PROCEDURE — 99233 SBSQ HOSP IP/OBS HIGH 50: CPT | Performed by: HOSPITALIST

## 2024-09-15 PROCEDURE — 93306 TTE W/DOPPLER COMPLETE: CPT | Performed by: INTERNAL MEDICINE

## 2024-09-15 RX ORDER — SODIUM CHLORIDE 9 MG/ML
INJECTION, SOLUTION INTRAVENOUS
Status: ACTIVE | OUTPATIENT
Start: 2024-09-16 | End: 2024-09-16

## 2024-09-15 RX ORDER — POTASSIUM CHLORIDE 1500 MG/1
40 TABLET, EXTENDED RELEASE ORAL EVERY 4 HOURS
Status: COMPLETED | OUTPATIENT
Start: 2024-09-15 | End: 2024-09-15

## 2024-09-15 RX ORDER — TEMAZEPAM 15 MG/1
15 CAPSULE ORAL NIGHTLY PRN
Status: DISCONTINUED | OUTPATIENT
Start: 2024-09-15 | End: 2024-09-17

## 2024-09-15 RX ORDER — MORPHINE SULFATE 2 MG/ML
2 INJECTION, SOLUTION INTRAMUSCULAR; INTRAVENOUS EVERY 2 HOUR PRN
Status: DISCONTINUED | OUTPATIENT
Start: 2024-09-15 | End: 2024-09-17

## 2024-09-15 RX ORDER — ASPIRIN 81 MG/1
81 TABLET, CHEWABLE ORAL DAILY
Status: DISCONTINUED | OUTPATIENT
Start: 2024-09-16 | End: 2024-09-16

## 2024-09-15 RX ORDER — CHLORHEXIDINE GLUCONATE 40 MG/ML
SOLUTION TOPICAL
Status: COMPLETED | OUTPATIENT
Start: 2024-09-16 | End: 2024-09-16

## 2024-09-15 RX ORDER — MORPHINE SULFATE 4 MG/ML
4 INJECTION, SOLUTION INTRAMUSCULAR; INTRAVENOUS EVERY 2 HOUR PRN
Status: DISCONTINUED | OUTPATIENT
Start: 2024-09-15 | End: 2024-09-17

## 2024-09-15 RX ORDER — MORPHINE SULFATE 2 MG/ML
1 INJECTION, SOLUTION INTRAMUSCULAR; INTRAVENOUS EVERY 2 HOUR PRN
Status: DISCONTINUED | OUTPATIENT
Start: 2024-09-15 | End: 2024-09-17

## 2024-09-15 RX ORDER — POTASSIUM CHLORIDE 1500 MG/1
40 TABLET, EXTENDED RELEASE ORAL ONCE
Status: COMPLETED | OUTPATIENT
Start: 2024-09-15 | End: 2024-09-15

## 2024-09-15 RX ORDER — ASPIRIN 81 MG/1
324 TABLET, CHEWABLE ORAL ONCE
Status: CANCELLED | OUTPATIENT
Start: 2024-09-16

## 2024-09-15 NOTE — ED PROVIDER NOTES
Patient Seen in: [facility] 3w/      History   No chief complaint on file.    Stated Complaint: Chest Pain Post Skin-Cryo Procedure    Subjective:   HPI  83 yoF with HTN and HLD presents for evaluation of chest pain.  Chest pain started while she was at rest at 10 AM today.  She describes it as a pressure located in the left anterior chest.  It is constant and nonradiating.  No numbness tingling or weakness of the arms or legs, ripping or tearing sensation, swelling the legs, recent travel, hospitalization or immobilization.  No cough or fevers.      Objective:   Past Medical History:    Essential hypertension    Hyperlipidemia              Past Surgical History:   Procedure Laterality Date    Colonoscopy,biopsy  2012    Procedure: COLONOSCOPY, POSSIBLE BIOPSY, POSSIBLE POLYPECTOMY 48322;  Surgeon: Jan Snyder MD;  Location: [facility]    Patient documented not to have experienced any of the following events  2012    Procedure: ESOPHAGOGASTRODUODENOSCOPY, COLONOSCOPY, POSSIBLE BIOPSY, POSSIBLE POLYPECTOMY 13133,10873;  Surgeon: Jan Snyder MD;  Location: [facility]    Patient withough preoperative order for iv antibiotic surgical site infection prophylaxis.  2012    Procedure: ESOPHAGOGASTRODUODENOSCOPY, COLONOSCOPY, POSSIBLE BIOPSY, POSSIBLE POLYPECTOMY 25963,05107;  Surgeon: Jan Snyder MD;  Location: [facility]                Social History     Socioeconomic History    Marital status:    Tobacco Use    Smoking status: Former     Current packs/day: 0.00     Average packs/day: 0.5 packs/day for 20.0 years (10.0 ttl pk-yrs)     Types: Cigarettes     Start date: 10/10/1987     Quit date: 10/10/2007     Years since quittin.9    Smokeless tobacco: Never   Vaping Use    Vaping status: Never Used   Substance and Sexual Activity    Alcohol use: Not Currently    Drug use: Never     Social Determinants of Health     Financial Resource  Strain: Low Risk  (4/16/2024)    Received from [facility]    Overall Financial Resource Strain (CARDIA)     Difficulty of Paying Living Expenses: Not hard at all   Food Insecurity: No Food Insecurity (4/16/2024)    Received from [facility]    Hunger Vital Sign     Worried About Running Out of Food in the Last Year: Never true     Ran Out of Food in the Last Year: Never true   Transportation Needs: No Transportation Needs (4/16/2024)    Received from [facility]    PRAPARE - Transportation     Lack of Transportation (Medical): No     Lack of Transportation (Non-Medical): No   Housing Stability: Unknown (4/16/2024)    Received from [facility]    Housing Stability Vital Sign     Unable to Pay for Housing in the Last Year: No     In the last 12 months, was there a time when you did not have a steady place to sleep or slept in a shelter (including now)?: No              Review of Systems    Positive for stated Chief Complaint: No chief complaint on file.    Other systems are as noted in HPI.  Constitutional and vital signs reviewed.      All other systems reviewed and negative except as noted above.    Physical Exam     ED Triage Vitals [09/14/24 1705]   /83   Pulse 58   Resp 16   Temp 98.1 °F (36.7 °C)   Temp src Oral   SpO2 96 %   O2 Device None (Room air)       Current Vitals:   Vital Signs  BP: (!) 147/91  Pulse: 61  Resp: 16  Temp: 98.1 °F (36.7 °C)  Temp src: Oral  MAP (mmHg): (!) 109    Oxygen Therapy  SpO2: 96 %  O2 Device: None (Room air)            Physical Exam  Vitals and nursing note reviewed.   Constitutional:       Appearance: She is well-developed.   HENT:      Head: Normocephalic and atraumatic.   Eyes:      Extraocular Movements: Extraocular movements intact.   Cardiovascular:      Rate and Rhythm: Normal rate and regular rhythm.      Heart sounds: Normal heart sounds.   Pulmonary:      Effort: Pulmonary effort  is normal.      Breath sounds: Normal breath sounds.   Abdominal:      General: There is no distension.      Palpations: Abdomen is soft.      Tenderness: There is no abdominal tenderness.   Musculoskeletal:         General: Normal range of motion.      Cervical back: Normal range of motion.      Right lower leg: No edema.      Left lower leg: No edema.   Skin:     General: Skin is warm.      Capillary Refill: Capillary refill takes less than 2 seconds.   Neurological:      Mental Status: She is alert.      Comments: No focal deficits       Differential diagnosis includes but is not limited to ACS/MI, dissection, PE, musculoskeletal pain, pleurisy        ED Course     Labs Reviewed   BASIC METABOLIC PANEL (8) - Abnormal; Notable for the following components:       Result Value    Glucose 104 (*)     All other components within normal limits   TROPONIN I HIGH SENSITIVITY - Abnormal; Notable for the following components:    Troponin I (High Sensitivity) 916 (*)     All other components within normal limits   D-DIMER - Abnormal; Notable for the following components:    D-Dimer 1.03 (*)     All other components within normal limits   LIPID PANEL - Abnormal; Notable for the following components:    LDL Cholesterol 123 (*)     Non HDL Chol 145 (*)     All other components within normal limits   PTT, ACTIVATED - Normal   PROTHROMBIN TIME (PT) - Normal   CBC WITH DIFFERENTIAL WITH PLATELET   TROPONIN I HIGH SENSITIVITY   TSH W REFLEX TO FREE T4   TROPONIN I HIGH SENSITIVITY     EKG at 1651    Rate, intervals and axes as noted on EKG Report.  Rate: 67  Rhythm: Atrial Fibrillation  Reading: No STEMI, compared to prior EKG rhythm is now true fibrillation    EKG at 1829    Rate, intervals and axes as noted on EKG Report.  Rate: 66  Rhythm: Atrial Fibrillation  Reading: No STEMI, no ectopy, no dynamic ST changes, prolonged QTc           CT CHEST PE AORTA (IV ONLY) (CPT=71260)    Result Date: 9/14/2024  CONCLUSION:   No pulmonary  embolus  4.5 cm ascending aortic aneurysm.  Follow-up CTA suggested in 12 months to demonstrate stability.  Mild interstitial edema.  Dilated main pulmonary artery can be seen with pulmonary hypertension.    Dictated by (CST): Torin Diaz MD on 9/14/2024 at 7:49 PM     Finalized by (CST): Torin Diaz MD on 9/14/2024 at 7:53 PM          XR CHEST AP PORTABLE  (CPT=71045)    Result Date: 9/14/2024  CONCLUSION: No acute cardiopulmonary abnormality.    Dictated by (CST): Torin Diaz MD on 9/14/2024 at 6:45 PM     Finalized by (CST): Torin Diaz MD on 9/14/2024 at 6:45 PM                    MDM        Admission disposition: 9/14/2024  8:16 PM             I spent a total of 40 minutes of critical care time in obtaining history, performing a physical exam, bedside monitoring of interventions, collecting and interpreting tests and discussion with consultants but not including time spent performing procedures.                             Medical Decision Making  Patient is well-appearing and ambulatory in ED.  EKG per my independent interpretation shows new onset atrial fibrillation with controlled rate.  I ordered interpreted labs including CBC and BMP which are unremarkable.  D-dimer was elevated and CT chest was obtained which was negative for clear PE.  There is 4.5 cm ascending aortic aneurysm.  Additionally high-sensitivity troponin elevated to 916.  2-hour troponin pending.  I discussed with RUDDY Soto with [facility] who advised heparin drip.  Discussed with Dr. Story for admission.    Problems Addressed:  Atrial fibrillation and flutter (HCC): complicated acute illness or injury with systemic symptoms  NSTEMI (non-ST elevated myocardial infarction) (HCC): complicated acute illness or injury with systemic symptoms that poses a threat to life or bodily functions    Amount and/or Complexity of Data Reviewed  Labs: ordered. Decision-making details documented in ED Course.  Radiology: ordered and independent interpretation  performed. Decision-making details documented in ED Course.  ECG/medicine tests: ordered and independent interpretation performed. Decision-making details documented in ED Course.  Discussion of management or test interpretation with external provider(s): As above    Risk  Drug therapy requiring intensive monitoring for toxicity.  Decision regarding hospitalization.        Disposition and Plan     Clinical Impression:  1. NSTEMI (non-ST elevated myocardial infarction) (HCC)    2. Atrial fibrillation and flutter (HCC)         Disposition:  Admit  9/14/2024  8:16 pm    Follow-up:  No follow-up provider specified.  We recommend that you schedule follow up care with a primary care provider within the next three months to obtain basic health screening including reassessment of your blood pressure.      Medications Prescribed:  Current Discharge Medication List                            Hospital Problems       Present on Admission  Date Reviewed: 9/14/2024            ICD-10-CM Noted POA    * (Principal) NSTEMI (non-ST elevated myocardial infarction) (HCC) I21.4 9/14/2024 Unknown    Atrial fibrillation and flutter (HCC) I48.91, I48.92 9/14/2024 Unknown

## 2024-09-15 NOTE — PROGRESS NOTES
Pt admitted from ED with Heparin drip infusing. Recent PTT therapeutic. Admission assessment done with med rec verified with patient. Pt still reported a dull 3/10 chest pain overnight but much improved per patient. Declined pain med for the discomfort. Troponins trended up. Dr. Story and ISAURO Soto aware. Pt placed NPO and troponins ordered q6. Pt aware of plan of care.

## 2024-09-15 NOTE — PLAN OF CARE
Problem: Patient Centered Care  Goal: Patient preferences are identified and integrated in the patient's plan of care  Description: Interventions:  - What would you like us to know as we care for you? Home with   - Provide timely, complete, and accurate information to patient/family  - Incorporate patient and family knowledge, values, beliefs, and cultural backgrounds into the planning and delivery of care  - Encourage patient/family to participate in care and decision-making at the level they choose  - Honor patient and family perspectives and choices  Outcome: Progressing     Problem: Patient/Family Goals  Goal: Patient/Family Long Term Goal  Description: Patient's Long Term Goal: to be free from pain    Interventions:  - monitor pain  - See additional Care Plan goals for specific interventions  Outcome: Progressing  Goal: Patient/Family Short Term Goal  Description: Patient's Short Term Goal: to go home    Interventions:   - monitor vitals  - See additional Care Plan goals for specific interventions  Outcome: Progressing     Problem: CARDIOVASCULAR - ADULT  Goal: Maintains optimal cardiac output and hemodynamic stability  Description: INTERVENTIONS:  - Monitor vital signs, rhythm, and trends  - Monitor for bleeding, hypotension and signs of decreased cardiac output  - Evaluate effectiveness of vasoactive medications to optimize hemodynamic stability  - Monitor arterial and/or venous puncture sites for bleeding and/or hematoma  - Assess quality of pulses, skin color and temperature  - Assess for signs of decreased coronary artery perfusion - ex. Angina  - Evaluate fluid balance, assess for edema, trend weights  Outcome: Progressing  Goal: Absence of cardiac arrhythmias or at baseline  Description: INTERVENTIONS:  - Continuous cardiac monitoring, monitor vital signs, obtain 12 lead EKG if indicated  - Evaluate effectiveness of antiarrhythmic and heart rate control medications as ordered  - Initiate  emergency measures for life threatening arrhythmias  - Monitor electrolytes and administer replacement therapy as ordered  Outcome: Progressing     Pt A&O4. On room air. Pt reported episodes of chest pain in the morning but reported it has resolved. Pt on heparin drip. PTT therapeutic. Troponin elevated (see chart for results). ECHO done today (see chart for results). Safety precautions in place.     Plan: Cath on 09/16/24

## 2024-09-15 NOTE — PROGRESS NOTES
Pt received in no acute distress, on hep gtt per protocol. Critical troponin relayed to ANNABELLE Cisneros. Pt reports 3/10 dull chest pain, denies increase in pain from overnight. Denies dyspnea. Plan for MD Porter to eval pt. Pt NPO at this time. Next troponin to be drawn at 1200. Results to be relayed to ANNABELLE FOX. Handoff report given to Justyna in [facility].       Mackenzie ANN RN

## 2024-09-15 NOTE — PROGRESS NOTES
Jeannette Zhang Patient Status:  Inpatient    10/10/1940 MRN [id number]   Location [facility] 3W/SW Attending Annie Story MD   Hosp Day # 1 PCP Harpreet Pratt MD     Cardiology Nocturnal APN Note    Briefly: (Documentation from chart review)     Jeannette Zhang is a 82 y/o female who presented with chest pain and has a PMH/PSH of:       Past Medical History:    Essential hypertension    Hyperlipidemia       Primary Cardiologist None    Vital Signs:       2024     9:35 PM 2024     9:45 PM   Vitals History   BP  147/90   BP Location  Left arm   Pulse  68   Resp  19   Temp  97.7 °F (36.5 °C)   SpO2  95 %   Weight 196 lbs 10 oz    BMI 32.72 kg/m2         Labs:   Lab Results   Component Value Date    WBC 7.8 2024    HGB 14.9 2024    HCT 45.2 2024    .0 2024    CREATSERUM 0.86 2024    BUN 15 2024     2024    K 3.9 2024     2024    CO2 26.0 2024     2024    CA 9.7 2024    PTT 29.3 2024    INR 1.00 2024    DDIMER 1.03 2024    TROPHS 1,706 2024       Diagnostics:   CT CHEST PE AORTA (IV ONLY) (CPT=71260)    Result Date: 2024  PROCEDURE: CT CHEST PE AORTA (IV ONLY) (CPT=71260)  COMPARISON: None.  INDICATIONS: eval for pe.  Shortness of breath  TECHNIQUE: CT images of the chest were obtained with non-ionic intravenous contrast material.  Automated exposure control for dose reduction was used. Adjustment of the mA and/or kV was done based on the patient's size. Use of iterative reconstruction technique for dose reduction was used. Dose information is transmitted to the ACR (American College of Radiology) NRDR (National Radiology Data Registry) which includes the Dose Index Registry.  FINDINGS:  PULMONARY ARTERIES:   No pulmonary embolus   LINES AND TUBES: None.  MEDIASTINUM/VASCULATURE: There are multiple mildly prominent mediastinal lymph nodes which are nonspecific  and measure less than 1 cm in short axis. There is atherosclerotic calcification of the aortic arch and thoracic aorta.  4.5 cm ascending aortic aneurysm.. Mediastinal fat planes are preserved. Cardiac chambers are enlarged. Pericardium is normal. There are coronary artery calcifications. Main pulmonary artery is dilated measuring up to 3.5 cm which can be seen with pulmonary hypertension.  LUNGS AND PLEURA: Mild interlobular septal thickening within the bilateral upper and lower lobes.  Ill-defined areas of ground-glass within the bilateral lower lobes suggestive of atelectasis.  No pneumothorax or pleural effusion. There is mild bronchial  wall thickening suggestive of chronic airway inflammation.  CHEST WALL/BONES: There is degenerative disease of the thoracic spine. The chest wall and osseous structures are otherwise unremarkable.  LIMITED ABDOMEN: Small hiatal hernia with wall thickening at the gastroesophageal junction.         CONCLUSION:   No pulmonary embolus  4.5 cm ascending aortic aneurysm.  Follow-up CTA suggested in 12 months to demonstrate stability.  Mild interstitial edema.  Dilated main pulmonary artery can be seen with pulmonary hypertension.    Dictated by (CST): Torin Diaz MD on 9/14/2024 at 7:49 PM     Finalized by (CST): Torin Diaz MD on 9/14/2024 at 7:53 PM          XR CHEST AP PORTABLE  (CPT=71045)    Result Date: 9/14/2024  PROCEDURE: XR CHEST AP PORTABLE  (CPT=71045) TIME: 1821.   COMPARISON: [facility], XR CHEST AP PORTABLE (CPT=71045), 7/15/2021, 10:36 AM.  [facility], XR CHEST PA + LAT CHEST (CPT=71046), 1/04/2020, 3:25 PM.  INDICATIONS: Chest pain and difficulty breathing x 1 day.  TECHNIQUE:   Single view.   FINDINGS:  CARDIAC/VASC: Normal.  No cardiac silhouette abnormality or cardiomegaly.  Unremarkable pulmonary vasculature.  MEDIAST/INGRIS:   No visible mass or adenopathy. LUNGS/PLEURA: Normal.  No significant pulmonary parenchymal abnormalities.   No effusion or pleural thickening. BONES: No fracture or visible bony lesion. OTHER: Negative.          CONCLUSION: No acute cardiopulmonary abnormality.    Dictated by (CST): Torin Diaz MD on 9/14/2024 at 6:45 PM     Finalized by (CST): Torin Diaz MD on 9/14/2024 at 6:45 PM            Allergies:  Allergies   Allergen Reactions    Allegra      Made heart race    Statins OTHER (SEE COMMENTS)     Stiff shoulder and hip pain    Statins-HMG-CoA Reductase inhibitors    Statins-HMG-CoA Reductase inhibitors      Stiff shoulder and hip pain       Medications:    ED initial dose heparin    continuous dose heparin    amLODIPine    metoprolol tartrate **AND** hydrochlorothiazide    losartan    rosuvastatin    aspirin    nitroglycerin    acetaminophen    acetaminophen **OR** HYDROcodone-acetaminophen **OR** HYDROcodone-acetaminophen    morphINE **OR** morphINE **OR** morphINE    melatonin    ondansetron    metoclopramide    hydrALAzine    Assessment:   Chest pain  - Troponin 1706  - ECG without acute changes  - On heparin gtt  - Trend troponin  - Ascending aorta at 4.5 which appears unchanged from prior finding in 2012 on CT calcium score testing      Plan:    - Continue to monitor overnight  - Formal Cardiology consult to follow in AM.       RUDDY Munoz  [facility]  9/15/2024  12:21 AM

## 2024-09-15 NOTE — PLAN OF CARE
Problem: Patient Centered Care  Goal: Patient preferences are identified and integrated in the patient's plan of care  Description: Interventions:  - What would you like us to know as we care for you? Home with   - Provide timely, complete, and accurate information to patient/family  - Incorporate patient and family knowledge, values, beliefs, and cultural backgrounds into the planning and delivery of care  - Encourage patient/family to participate in care and decision-making at the level they choose  - Honor patient and family perspectives and choices  Outcome: Progressing     Problem: Patient/Family Goals  Goal: Patient/Family Long Term Goal  Description: Patient's Long Term Goal: to be free from pain    Interventions:  - monitor pain  - See additional Care Plan goals for specific interventions  Outcome: Progressing  Goal: Patient/Family Short Term Goal  Description: Patient's Short Term Goal: to go home    Interventions:   - monitor vitals  - See additional Care Plan goals for specific interventions  Outcome: Progressing     Problem: CARDIOVASCULAR - ADULT  Goal: Maintains optimal cardiac output and hemodynamic stability  Description: INTERVENTIONS:  - Monitor vital signs, rhythm, and trends  - Monitor for bleeding, hypotension and signs of decreased cardiac output  - Evaluate effectiveness of vasoactive medications to optimize hemodynamic stability  - Monitor arterial and/or venous puncture sites for bleeding and/or hematoma  - Assess quality of pulses, skin color and temperature  - Assess for signs of decreased coronary artery perfusion - ex. Angina  - Evaluate fluid balance, assess for edema, trend weights  Outcome: Progressing  Goal: Absence of cardiac arrhythmias or at baseline  Description: INTERVENTIONS:  - Continuous cardiac monitoring, monitor vital signs, obtain 12 lead EKG if indicated  - Evaluate effectiveness of antiarrhythmic and heart rate control medications as ordered  - Initiate  emergency measures for life threatening arrhythmias  - Monitor electrolytes and administer replacement therapy as ordered  Outcome: Progressing     Pt alert and oriented X 4. Pt on room air. Pt had complaints of chest pain, PRN medication given with relief. Pt on a heparin drip. Pt had an echo completed, see results. Safety precautions in place, call light within reach. Plan is for a cath tomorrow.

## 2024-09-15 NOTE — CONSULTS
[facility]                                                            CONSULT NOTE      Patient Name: Jeannette Zhang MRN: [id number]   : 10/10/1940 CSN: [id number]       Reason for consultation:   Asked by Roni Rosas MD to provide evaluation and management of chest pain.    Assessment and Recommendations:     NSTEMI (non-ST elevated myocardial infarction) (HCC)  -Characteristics of chest pain are not typical for angina (at rest, positional), however nonspecific ECG changes and troponin results are consistent with an MI, possibly type 2  -This could reflect native coronary disease, or thromboembolic MI from AF, myopericarditis, or Takotsubo phenomenon. CTA chest ruled out significant PE or acute aortic pathology.  -We will treat with IV heparin for presumed ACS until rule out  -Was on bisoprolol 5 at home. Will change to metoprolol tartrate now.  -Aspirin 81 mg daily  -Myalgias/arthralgias from rosuvastatin in the past, so will start atorvastati  -Morphine IV for pain PRN  -Echocardiogram  -Plan left heart cath +/- PCI tomorrow.  Goals, alts, risks, reviewed with pt and family (daughter and ) on speaker phone.    Hypertension  -Change bisoprolol-hydrochlorothiazide to metoprolol and hydrochlorothiazide  -Continue home doses of losartan and amlodipine  -Adjust meds as indicated    Hyperlipidemia  -Has myalgias/arthralgias with rosuvastatin  -Await results of coronary evaluation to decide on appropriate next treatment    Atrial fibrillation (HCC)  -New diagnosis  -Controlled rates, on no rate meds  -Asymptomatic  -We reviewed AF, risk factors, natural course, treatment goals, and treatment options  -Continue with observational approach with rate control for now  -Continue IV anticoagulation as above, and later switch to DOAC  -Long-term rate vs rhythm control approaches will be addressed after acute issues  resolved and during outpt f/u.    Ascending aortic aneurysm  -CTA chest identified a 4.5 cm ascending aorta, without dissection or other pathology  -Outpatient monitoring    Thank you for the consultation.    Pascaul Porter MD  Cardiology/EP  [facility]  9/15/2024  Critical care time (minutes):  40  Critical care time was exclusive of separately billable procedures and treating other patients. Critical care was necessary to treat or prevent imminent or life-threatening deterioration of the following conditions: acute MI, cardiac arrest, respiratory failure, discussed with pt and family complex rhythm management plan and coronary artery disease plan with procedures/surgery. Critical care was time spent personally by me on the following activities: Development of treatment plan with patient or surrogate, discussions with consultants, discussions with primary provider, evaluation of patient's response to treatment, examination of patient, obtaining history from patient or surrogate, ordering and performing treatments and interventions, ordering and review of laboratory studies, radiographic studies, vital signs and re-evaluation of patient's condition.      History of present illness:   The patient is a 83 year old with onset of chest pain yesterday at 10 AM, occurred at rest, was constant, moderately intense, and associated with dyspnea which then resolved and hasn't returned.  She noted an irregular peripheral pulse at times, but had no palpitations.  She has no cough, pleuritic pain, hemoptysis.  She notes the pain is worse if she rolls on her left or right side, and is lessened by resting flat in bed.    ROS:   Constitutional: No fevers, chills, fatigue or night sweats.  ENT: No mouth pain, neck pain, running nose, headaches or swollen glands.  Skin: + anterior chest lesion was cryo treated  Respiratory: No cough, wheezing, + transient shortness of breath.  CV: No  claudication.  Musculoskeletal: + hip joint pain with rosuvastatin, no stiffness or swelling.  : No dysuria or hematuria.  GI: No nausea, vomiting or diarrhea. No blood in stools.  Neurologic: No seizures, tremors, weakness or numbness.    Past Medical, Surgical, Family, and Social Histories:     Past Medical History:    Essential hypertension    Hyperlipidemia     Past Surgical History:   Procedure Laterality Date    Colonoscopy,biopsy  11/13/2012    Procedure: COLONOSCOPY, POSSIBLE BIOPSY, POSSIBLE POLYPECTOMY 65686;  Surgeon: Jan Snyder MD;  Location: [facility]    Patient documented not to have experienced any of the following events  11/13/2012    Procedure: ESOPHAGOGASTRODUODENOSCOPY, COLONOSCOPY, POSSIBLE BIOPSY, POSSIBLE POLYPECTOMY 86043,67228;  Surgeon: Jan Snyder MD;  Location: [facility]    Patient withough preoperative order for iv antibiotic surgical site infection prophylaxis.  11/13/2012    Procedure: ESOPHAGOGASTRODUODENOSCOPY, COLONOSCOPY, POSSIBLE BIOPSY, POSSIBLE POLYPECTOMY 57252,23899;  Surgeon: Jan Snyder MD;  Location: [facility]     FHX:   No premature CAD/SCA    SHX:  The patient reports that she quit smoking about 16 years ago. Her smoking use included cigarettes. She started smoking about 36 years ago. She has a 10 pack-year smoking history. She has never used smokeless tobacco. She reports that she does not currently use alcohol. She reports that she does not use drugs.    Allergies:     Allergies   Allergen Reactions    Allegra      Made heart race    Statins OTHER (SEE COMMENTS)     Stiff shoulder and hip pain    Statins-HMG-CoA Reductase inhibitors    Statins-HMG-CoA Reductase inhibitors      Stiff shoulder and hip pain       Medications:     Current Outpatient Medications   Medication Instructions    AMLODIPINE BESYLATE 5 MG Oral Tab TAKE ONE TABLET BY MOUTH ONCE DAILY    BISOPROLOL-HYDROCHLOROTHIAZIDE 5-6.25 MG Oral Tab TAKE ONE  TABLET BY MOUTH ONCE DAILY    Calcium Carb-Cholecalciferol 500-10 MG-MCG Oral Tab 1 tablet, Oral, Daily    Cholecalciferol 2,000 Units, Oral, Daily    LOSARTAN 100 MG Oral Tab TAKE ONE TABLET BY MOUTH ONCE DAILY    Multiple Vitamin (MULTIVITAMINS OR) 1 tablet, Oral, Daily     PHYSICAL EXAM:   Blood pressure (!) 145/98, pulse 65, temperature 97.8 °F (36.6 °C), temperature source Oral, resp. rate 18, height 165.1 cm (5' 5\"), weight 195 lb 6.4 oz (88.6 kg), SpO2 95%, not currently breastfeeding.  GEN - no distress  HEENT - normal conjunctiva, moist mucosa  Neck - supple, no LAD  Lungs - nonlabored, clear bilaterally  Heart - JVP 14 cm H2O, carotids normal; RRR, nl S1/S2, no murmur, gallop, or rub; no edema  Abd - soft, nontender  Ext - arthritic changes  Neuro - A+Ox3, no facial droop or gross deficits  Psych - cooperative, calm    LABS AND DATA:     ECG: atrial fibrillation, nonspec T wave abnormality    Lab Results   Component Value Date    WBC 7.8 09/14/2024    HGB 14.9 09/14/2024    HCT 45.2 09/14/2024    .0 09/14/2024    CREATSERUM 0.80 09/15/2024    BUN 11 09/15/2024     09/15/2024    K 3.5 09/15/2024     09/15/2024    CO2 26.0 09/15/2024     (H) 09/15/2024    CA 9.0 09/15/2024    PTT 74.4 (H) 09/15/2024    INR 1.00 09/14/2024    TSH 2.547 09/14/2024    DDIMER 1.03 (H) 09/14/2024    MG 2.0 09/15/2024     Recent Labs   Lab 09/14/24  2044 09/14/24  2349 09/15/24  0638   TROPHS 1,706* 2,741* 4,930*       Imaging: I independently visualized all relevant chest imaging in PACS, agree with radiology interpretation except where noted.    CT CHEST PE AORTA (IV ONLY) (CPT=71260)    Result Date: 9/14/2024  CONCLUSION:   No pulmonary embolus  4.5 cm ascending aortic aneurysm.  Follow-up CTA suggested in 12 months to demonstrate stability.  Mild interstitial edema.  Dilated main pulmonary artery can be seen with pulmonary hypertension.    Dictated by (CST): Torin Diaz MD on 9/14/2024 at 7:49 PM      Finalized by (CST): Torin Diaz MD on 9/14/2024 at 7:53 PM          XR CHEST AP PORTABLE  (CPT=71045)    Result Date: 9/14/2024  CONCLUSION: No acute cardiopulmonary abnormality.    Dictated by (CST): Torin Diaz MD on 9/14/2024 at 6:45 PM     Finalized by (CST): Torin Diaz MD on 9/14/2024 at 6:45 PM             ------------------------------------------------------------------------------------------------------------------------------

## 2024-09-15 NOTE — H&P
[facility]  part of [facility]                                                                                                          History and Physical     Jeannette Zhang Patient Status:  Emergency    10/10/1940 MRN [id number]   Location [facility] EMERGENCY DEPARTMENT Attending Mariaelena Lawton MD   Hosp Day # 0 PCP Harpreet Pratt MD       Chief Complaint: Chest pain    Subjective:    Jeannette Zhang is a 83 year old female with history of HTN, HLD, PMR who presented to the ED with complaints of chest pain.  Onset about 10 AM today while at rest.  Associated with mild difficulty in breathing.  She initially attributed pain to lesion on chest that was 'frozen' yesterday.  Pain persisted through the day, not exacerbated by activity.  Due to persistent, patient presented to the ED for further evaluation.  EKG showed rate controlled atrial fibrillation with nonspecific T wave abnormalities.  Labs significant for troponin 916  D-dimer 1.03  CTA chest: No PE.  4.5 cm ascending aortic aneurysm.  Mild interstitial edema.  Dilated main pulmonary artery as can be seen with pulmonary HTN.      Vitals noted with elevated BP, otherwise stable  She denies any recent illnesses.  No cough, shortness of breath, palpitations, dizziness, nausea, vomiting or diaphoresis.    Cardiology consulted.  Recommended starting patient on heparin drip and admission for further evaluation and treatment.    Prior episode of chest pain in .  Had Lexiscan stress test that was normal with EF 74%.  No other issues since then.    History/Other:      Past Medical History:  Past Medical History:    Essential hypertension    Hyperlipidemia  Polymyalgia rheumatica  Osteopenia        Past Surgical History:   Past Surgical History:   Procedure Laterality Date    Colonoscopy,biopsy  2012    Procedure: COLONOSCOPY, POSSIBLE BIOPSY, POSSIBLE POLYPECTOMY 87839;  Surgeon: Jan Snyder MD;  [place] [facility]    Patient documented not to have experienced any of the following events  11/13/2012    Procedure: ESOPHAGOGASTRODUODENOSCOPY, COLONOSCOPY, POSSIBLE BIOPSY, POSSIBLE POLYPECTOMY 76858,66107;  Surgeon: Jan Snyder MD;  Location: [facility]    Patient withough preoperative order for iv antibiotic surgical site infection prophylaxis.  11/13/2012    Procedure: ESOPHAGOGASTRODUODENOSCOPY, COLONOSCOPY, POSSIBLE BIOPSY, POSSIBLE POLYPECTOMY 66160,41388;  Surgeon: Jan Snyder MD;  Location: [facility]       Social History:  reports that she quit smoking about 16 years ago. Her smoking use included cigarettes. She started smoking about 36 years ago. She has a 10 pack-year smoking history. She has never used smokeless tobacco. She reports that she does not currently use alcohol. She reports that she does not use drugs.    Family History: No family history on file.    Allergies:   Allergies   Allergen Reactions    Allegra      Made heart race    Statins OTHER (SEE COMMENTS)     Stiff shoulder and hip pain    Statins-HMG-CoA Reductase inhibitors    Statins-HMG-CoA Reductase inhibitors      Stiff shoulder and hip pain       Medications:    No current facility-administered medications on file prior to encounter.     Current Outpatient Medications on File Prior to Encounter   Medication Sig Dispense Refill    Cholecalciferol 50 MCG (2000 UT) Oral Tab Take 1 tablet (2,000 Units total) by mouth daily.      Calcium Carb-Cholecalciferol 500-10 MG-MCG Oral Tab Take 1 tablet by mouth daily.      BISOPROLOL-HYDROCHLOROTHIAZIDE 5-6.25 MG Oral Tab TAKE ONE TABLET BY MOUTH ONCE DAILY 90 tablet 0    LOSARTAN 100 MG Oral Tab TAKE ONE TABLET BY MOUTH ONCE DAILY 90 tablet 0    AMLODIPINE BESYLATE 5 MG Oral Tab TAKE ONE TABLET BY MOUTH ONCE DAILY (Patient taking differently: Take 2 tablets (10 mg total) by mouth daily.) 90 tablet 2    Rosuvastatin Calcium (CRESTOR) 10 MG Oral Tab Take 1 tablet (10 mg  total) by mouth nightly. (Patient not taking: Reported on 9/14/2024) 90 tablet 3    Rosuvastatin Calcium (CRESTOR) 20 MG Oral Tab Take 1 tablet (20 mg total) by mouth once daily. (Patient not taking: Reported on 9/14/2024) 90 tablet 3    MetroNIDAZOLE (METROGEL) 1 % Apply Externally Gel aPPLY TO FACE FOR ROSECEA (Patient not taking: Reported on 9/14/2024)         Review of Systems:   A comprehensive 14 point review of systems was completed.    Pertinent positives and negatives noted in the HPI.    Objective:     /83   Pulse 62   Temp 98.1 °F (36.7 °C) (Oral)   Resp 17   Ht 5' 5\" (1.651 m)   Wt 195 lb (88.5 kg)   SpO2 98%   BMI 32.45 kg/m²   General: No acute distress.    HEENT: Normocephalic, atraumatic.  Neck: Supple, no JVD.  Respiratory: Normal effort.  CTAB  Cardiovascular: Irregular heart rate.  Normal rate.  No murmur  Abdomen: Soft, not tender or distended.  Neurologic: Alert, oriented x 3.  Nonfocal.  Musculoskeletal: No tenderness or deformity.  Extremities: No edema  Integument: Chest lesion/blister.   Psychiatric: Cooperative and appropriate    Results:      Labs:  Labs Last 24 Hours:   BMP     CBC    Other     Na 137 Cl 103 BUN 15 Glu 104   Hb 14.9   PTT 29.3 Procal -   K 3.9 CO2 26.0 Cr 0.86   WBC 7.8 >< .0  INR 1.00 CRP -   Renal Lytes Endo    Hct 45.2   Trop - D dim 1.03   eGFR - Ca 9.7 POC Gluc  -    LFT   pBNP - Lactic -   eGFR AA - PO4 - A1c -   AST - APk - Prot -  LDL -     Mg - TSH -   ALT - T sahra - Alb -          COVID-19 Lab Results    COVID-19  No results found for: \"COVID19\"    Pro-Calcitonin  No results for input(s): \"PCT\" in the last 168 hours.    Cardiac  No results for input(s): \"TROP\", \"PBNP\" in the last 168 hours.    Creatinine Kinase  No results for input(s): \"CK\" in the last 168 hours.    Inflammatory Markers  Recent Labs   Lab 09/14/24  1836   DDIMER 1.03*       Imaging: Imaging data reviewed in Epic.    Assessment & Plan:    Chest pain  NSTEMI/elevated  troponin  New onset atrial fibrillation, rate controlled  -Admit  -Continue heparin drip per ACS protocol  -Continue to trend troponin  -TSH with reflex  -Monitor on telemetry  -Echocardiogram  -Cardiology consulted and following    HTN  -Home medications  -Hydralazine as needed    HLD  -Statins    Ascending aortic aneurysm  -Follow-up 12 months recommended by radiology discussed with patient    Quality:  DVT Prophylaxis: Heparin drip  CODE status: Full code  PARDEEP: TBD        Plan of care discussed with patient. Acknowledged understanding and agrees to plan. Also discussed with the ED physician.    High MDM  Time spent on this admission - examining patient, obtaining history, reviewing previous medical records, going over test results/imaging and discussing plan of care. More than 50% of the time was spent in counseling and/or coordination of care related to NSTEMI.   All questions answered.     Annie Story MD  9/14/2024

## 2024-09-15 NOTE — ED QUICK NOTES
Orders for admission, patient is aware of plan and ready to go upstairs. Any questions, please call ED RN Callum at extension [phone].     Patient Covid vaccination status: Fully vaccinated     COVID Test Ordered in ED: None    COVID Suspicion at Admission: N/A    Running Infusions:    [START ON 9/15/2024] continuous dose heparin      None    Mental Status/LOC at time of transport: a&ox4    Other pertinent information:   CIWA score: N/A   NIH score:  N/A

## 2024-09-15 NOTE — PROGRESS NOTES
[facility]  part of [facility]    Progress Note    Jeannette Zhang Patient Status:  Inpatient    10/10/1940 MRN [id number]   Location [facility] 3W/SW Attending Roni Rosas MD   Hosp Day # 1 PCP Harpreet Pratt MD     Chief Complaint:     NSTEMI    Subjective:   Subjective:    Patient seen and examined this afternoon  States that he is having pain anymore palpitations.  Fevers or chills  Blood pressure is controlled  Objective:   Blood pressure (!) 140/98, pulse 70, temperature 98 °F (36.7 °C), temperature source Oral, resp. rate 16, height 5' 5\" (1.651 m), weight 195 lb 6.4 oz (88.6 kg), SpO2 92%, not currently breastfeeding.  Physical Exam    General: Patient is alert and oriented x3 does not appear to be in acute distress at this time  HEENT: EOMI PERRLA, atraumatic normocephalic  Cardiac: S1-S2 appreciated  Lungs: Good air entry bilaterally clear to auscultation  Abdomen: Soft nontender nondistended positive bowel sounds  Ext: Peripheral pulses are positive  Neuro: No focal deficits noted  Psych: Normal mood  Skin: No rashes noted  MSK: Full range of motion intact      Results:   Lab Results   Component Value Date    WBC 7.8 2024    HGB 14.9 2024    HCT 45.2 2024    .0 2024    CREATSERUM 0.80 09/15/2024    BUN 11 09/15/2024     09/15/2024    K 3.5 09/15/2024     09/15/2024    CO2 26.0 09/15/2024     (H) 09/15/2024    CA 9.0 09/15/2024    ALB 4.1 2024    ALKPHO 92 2024    BILT 0.8 2024    TP 7.1 2024    AST 16 2024    ALT 14 2024    PTT 53.4 (H) 09/15/2024    INR 1.00 2024    TSH 2.547 2024    DDIMER 1.03 (H) 2024    MG 2.0 09/15/2024    TROP <0.045 07/15/2021    TROPHS 4,465 (HH) 09/15/2024    CK 48 10/23/2016       CT CHEST PE AORTA (IV ONLY) (CPT=71260)    Result Date: 2024  CONCLUSION:   No pulmonary embolus  4.5 cm ascending aortic aneurysm.  Follow-up CTA  suggested in 12 months to demonstrate stability.  Mild interstitial edema.  Dilated main pulmonary artery can be seen with pulmonary hypertension.    Dictated by (CST): Torin Diaz MD on 9/14/2024 at 7:49 PM     Finalized by (CST): Torin Diaz MD on 9/14/2024 at 7:53 PM          XR CHEST AP PORTABLE  (CPT=71045)    Result Date: 9/14/2024  CONCLUSION: No acute cardiopulmonary abnormality.    Dictated by (CST): Torin Diaz MD on 9/14/2024 at 6:45 PM     Finalized by (CST): Torin Diaz MD on 9/14/2024 at 6:45 PM         EKG    Result Date: 9/15/2024  Atrial fibrillation Nonspecific T wave abnormality Prolonged QT interval or tu fusion, consider myocardial disease, electrolyte imbalance, or drug effects Abnormal ECG When compared with ECG of 14-SEP-2024 16:51, Previous ECG has undetermined rhythm, needs review Confirmed by DO Velasco Asif ([id number]) on 9/15/2024 7:44:41 AM    EKG 12 Lead    Result Date: 9/15/2024  Undetermined rhythm Septal infarct , age undetermined Abnormal ECG No previous ECGs found in Muse Confirmed by DO Velasco Asif ([id number]) on 9/15/2024 7:38:17 AM     Assessment & Plan:       Chest pain  NSTEMI/elevated troponin  New onset atrial fibrillation, rate controlled  -Admit  -Continue heparin drip per ACS protocol  -Continue to trend troponin  -TSH with reflex  -Significant troponin elevation  -Monitor on telemetry  -Echocardiogram  -Cardiology consulted and following     Atrial fibrillation  -New diagnosis  -Control rates currently on no meds  -Will continue to monitor on telemetry  -Currently on IV heparin for the above    Ascending aortic aneurysm  -Patient had a CTA chest which showed a 4.5 cm ascending aortic aneurysm  -Currently without dissection  -Patient will need to follow-up with outpatient.    HTN  -Home medications  -Hydralazine as needed     HLD  -Statins     Ascending aortic aneurysm  -Follow-up 12 months recommended by radiology discussed with patient     Quality:  DVT Prophylaxis:  Heparin drip  CODE status: Full code  PARDEEP: TBD      Global A/P  -Plan for  cardiac cath tomorrow  -Will continue IV heparin at this time  -Discussed with the patient and family at the bedside  -Reviewed previous consultant notes  -Reviewed CBC, BMP, Mag, and Phos  -Reviewed tests ordered  -Repeat labs in am  -MDM: High, severe exacerbation of chronic illness posing a threat to life. IV medications requiring close inpatient monitoring.       **Certification      PHYSICIAN Certification of Need for Inpatient Hospitalization - Initial Certification    Patient will require inpatient services that will reasonably be expected to span two midnight's based on the clinical documentation in H+P.   Based on patients current state of illness, I anticipate that, after discharge, patient will require TBD.      Roni Rosas MD  9/15/2024

## 2024-09-16 ENCOUNTER — APPOINTMENT (OUTPATIENT)
Dept: INTERVENTIONAL RADIOLOGY/VASCULAR | Facility: HOSPITAL | Age: 84
End: 2024-09-16
Attending: NURSE PRACTITIONER
Payer: MEDICARE

## 2024-09-16 LAB
ANION GAP SERPL CALC-SCNC: 9 MMOL/L (ref 0–18)
APTT PPP: 29 SECONDS (ref 23–36)
APTT PPP: 40.8 SECONDS (ref 23–36)
APTT PPP: 57.3 SECONDS (ref 23–36)
BASOPHILS # BLD AUTO: 0.04 X10(3) UL (ref 0–0.2)
BASOPHILS NFR BLD AUTO: 0.5 %
BUN BLD-MCNC: 9 MG/DL (ref 9–23)
BUN/CREAT SERPL: 11.4 (ref 10–20)
CALCIUM BLD-MCNC: 9.7 MG/DL (ref 8.7–10.4)
CHLORIDE SERPL-SCNC: 106 MMOL/L (ref 98–112)
CO2 SERPL-SCNC: 23 MMOL/L (ref 21–32)
CREAT BLD-MCNC: 0.79 MG/DL
DEPRECATED RDW RBC AUTO: 40.1 FL (ref 35.1–46.3)
EGFRCR SERPLBLD CKD-EPI 2021: 75 ML/MIN/1.73M2 (ref 60–?)
EOSINOPHIL # BLD AUTO: 0.07 X10(3) UL (ref 0–0.7)
EOSINOPHIL NFR BLD AUTO: 0.8 %
ERYTHROCYTE [DISTWIDTH] IN BLOOD BY AUTOMATED COUNT: 12.7 % (ref 11–15)
GLUCOSE BLD-MCNC: 101 MG/DL (ref 70–99)
HCT VFR BLD AUTO: 44.5 %
HGB BLD-MCNC: 15.5 G/DL
IMM GRANULOCYTES # BLD AUTO: 0.03 X10(3) UL (ref 0–1)
IMM GRANULOCYTES NFR BLD: 0.3 %
ISTAT ACTIVATED CLOTTING TIME: 262 SECONDS (ref 125–137)
LYMPHOCYTES # BLD AUTO: 1.65 X10(3) UL (ref 1–4)
LYMPHOCYTES NFR BLD AUTO: 19.1 %
MAGNESIUM SERPL-MCNC: 2 MG/DL (ref 1.6–2.6)
MCH RBC QN AUTO: 30.2 PG (ref 26–34)
MCHC RBC AUTO-ENTMCNC: 34.8 G/DL (ref 31–37)
MCV RBC AUTO: 86.6 FL
MONOCYTES # BLD AUTO: 0.92 X10(3) UL (ref 0.1–1)
MONOCYTES NFR BLD AUTO: 10.7 %
NEUTROPHILS # BLD AUTO: 5.92 X10 (3) UL (ref 1.5–7.7)
NEUTROPHILS # BLD AUTO: 5.92 X10(3) UL (ref 1.5–7.7)
NEUTROPHILS NFR BLD AUTO: 68.6 %
OSMOLALITY SERPL CALC.SUM OF ELEC: 285 MOSM/KG (ref 275–295)
PHOSPHATE SERPL-MCNC: 4.3 MG/DL (ref 2.4–5.1)
PLATELET # BLD AUTO: 227 10(3)UL (ref 150–450)
POTASSIUM SERPL-SCNC: 4.3 MMOL/L (ref 3.5–5.1)
POTASSIUM SERPL-SCNC: 4.3 MMOL/L (ref 3.5–5.1)
RBC # BLD AUTO: 5.14 X10(6)UL
SODIUM SERPL-SCNC: 138 MMOL/L (ref 136–145)
WBC # BLD AUTO: 8.6 X10(3) UL (ref 4–11)

## 2024-09-16 PROCEDURE — 99233 SBSQ HOSP IP/OBS HIGH 50: CPT | Performed by: HOSPITALIST

## 2024-09-16 PROCEDURE — B2151ZZ FLUOROSCOPY OF LEFT HEART USING LOW OSMOLAR CONTRAST: ICD-10-PCS | Performed by: INTERNAL MEDICINE

## 2024-09-16 PROCEDURE — 027034Z DILATION OF CORONARY ARTERY, ONE ARTERY WITH DRUG-ELUTING INTRALUMINAL DEVICE, PERCUTANEOUS APPROACH: ICD-10-PCS | Performed by: INTERNAL MEDICINE

## 2024-09-16 PROCEDURE — 4A023N7 MEASUREMENT OF CARDIAC SAMPLING AND PRESSURE, LEFT HEART, PERCUTANEOUS APPROACH: ICD-10-PCS | Performed by: INTERNAL MEDICINE

## 2024-09-16 PROCEDURE — B2111ZZ FLUOROSCOPY OF MULTIPLE CORONARY ARTERIES USING LOW OSMOLAR CONTRAST: ICD-10-PCS | Performed by: INTERNAL MEDICINE

## 2024-09-16 RX ORDER — HEPARIN SODIUM 1000 [USP'U]/ML
INJECTION, SOLUTION INTRAVENOUS; SUBCUTANEOUS
Status: COMPLETED
Start: 2024-09-16 | End: 2024-09-16

## 2024-09-16 RX ORDER — NITROGLYCERIN 20 MG/100ML
INJECTION INTRAVENOUS
Status: COMPLETED
Start: 2024-09-16 | End: 2024-09-16

## 2024-09-16 RX ORDER — SODIUM CHLORIDE 9 MG/ML
INJECTION, SOLUTION INTRAVENOUS CONTINUOUS
Status: ACTIVE | OUTPATIENT
Start: 2024-09-16 | End: 2024-09-16

## 2024-09-16 RX ORDER — CLOPIDOGREL BISULFATE 75 MG/1
TABLET ORAL
Status: COMPLETED
Start: 2024-09-16 | End: 2024-09-16

## 2024-09-16 RX ORDER — ATORVASTATIN CALCIUM 10 MG/1
10 TABLET, FILM COATED ORAL NIGHTLY
Status: DISCONTINUED | OUTPATIENT
Start: 2024-09-16 | End: 2024-09-16

## 2024-09-16 RX ORDER — CLOPIDOGREL BISULFATE 75 MG/1
75 TABLET ORAL DAILY
Status: DISCONTINUED | OUTPATIENT
Start: 2024-09-17 | End: 2024-09-17

## 2024-09-16 RX ORDER — ASPIRIN 81 MG/1
81 TABLET ORAL DAILY
Status: DISCONTINUED | OUTPATIENT
Start: 2024-09-17 | End: 2024-09-17

## 2024-09-16 RX ORDER — LIDOCAINE HYDROCHLORIDE 20 MG/ML
INJECTION, SOLUTION EPIDURAL; INFILTRATION; INTRACAUDAL; PERINEURAL
Status: COMPLETED
Start: 2024-09-16 | End: 2024-09-16

## 2024-09-16 RX ORDER — PRAVASTATIN SODIUM 10 MG
20 TABLET ORAL NIGHTLY
Status: DISCONTINUED | OUTPATIENT
Start: 2024-09-16 | End: 2024-09-17

## 2024-09-16 RX ORDER — MIDAZOLAM HYDROCHLORIDE 1 MG/ML
INJECTION INTRAMUSCULAR; INTRAVENOUS
Status: COMPLETED
Start: 2024-09-16 | End: 2024-09-16

## 2024-09-16 NOTE — PROGRESS NOTES
[facility]  part of [facility]    Progress Note    Jeannette Zhang Patient Status:  Inpatient    10/10/1940 MRN [id number]   Location [facility] 3W/SW Attending Roni Rosas MD   Hosp Day # 2 PCP Harpreet Pratt MD     Chief Complaint:     NSTEMI    Subjective:   Subjective:    Patient seen and examined this afternoon  States that she is soraida having pain anymore palpitations.    Denies Fevers or chills  Blood pressure is controlled  Objective:   Blood pressure 126/56, pulse 70, temperature 98.4 °F (36.9 °C), temperature source Oral, resp. rate 20, height 5' 5\" (1.651 m), weight 190 lb 9.6 oz (86.5 kg), SpO2 92%, not currently breastfeeding.  Physical Exam    General: Patient is alert and oriented x3 does not appear to be in acute distress at this time  HEENT: EOMI PERRLA, atraumatic normocephalic  Cardiac: S1-S2 appreciated  Lungs: Good air entry bilaterally clear to auscultation  Abdomen: Soft nontender nondistended positive bowel sounds  Ext: Peripheral pulses are positive  Neuro: No focal deficits noted  Psych: Normal mood  Skin: No rashes noted  MSK: Full range of motion intact      Results:   Lab Results   Component Value Date    WBC 8.6 2024    HGB 15.5 2024    HCT 44.5 2024    .0 2024    CREATSERUM 0.79 2024    BUN 9 2024     2024    K 4.3 2024    K 4.3 2024     2024    CO2 23.0 2024     (H) 2024    CA 9.7 2024    ALB 4.1 2024    ALKPHO 92 2024    BILT 0.8 2024    TP 7.1 2024    AST 16 2024    ALT 14 2024    PTT 57.3 (H) 2024    INR 1.00 2024    TSH 2.547 2024    DDIMER 1.03 (H) 2024    MG 2.0 2024    PHOS 4.3 2024    TROP <0.045 07/15/2021    TROPHS 4,465 (HH) 09/15/2024    CK 48 10/23/2016       CT CHEST PE AORTA (IV ONLY) (CPT=71260)    Result Date: 2024  CONCLUSION:   No pulmonary embolus   4.5 cm ascending aortic aneurysm.  Follow-up CTA suggested in 12 months to demonstrate stability.  Mild interstitial edema.  Dilated main pulmonary artery can be seen with pulmonary hypertension.    Dictated by (CST): Torin Diaz MD on 9/14/2024 at 7:49 PM     Finalized by (CST): Torin Diaz MD on 9/14/2024 at 7:53 PM          XR CHEST AP PORTABLE  (CPT=71045)    Result Date: 9/14/2024  CONCLUSION: No acute cardiopulmonary abnormality.    Dictated by (CST): Torin Diaz MD on 9/14/2024 at 6:45 PM     Finalized by (CST): Torin Diaz MD on 9/14/2024 at 6:45 PM         EKG    Result Date: 9/15/2024  Atrial fibrillation Nonspecific T wave abnormality Prolonged QT interval or tu fusion, consider myocardial disease, electrolyte imbalance, or drug effects Abnormal ECG When compared with ECG of 14-SEP-2024 16:51, Previous ECG has undetermined rhythm, needs review Confirmed by DO Velasco Asif ([id number]) on 9/15/2024 7:44:41 AM    EKG 12 Lead    Result Date: 9/15/2024  Undetermined rhythm Septal infarct , age undetermined Abnormal ECG No previous ECGs found in Muse Confirmed by DO Velasco Asif ([id number]) on 9/15/2024 7:38:17 AM     Assessment & Plan:       Chest pain  NSTEMI/elevated troponin  New onset atrial fibrillation, rate controlled  -Admit  -Continue heparin drip per ACS protocol  -Continue to trend troponin  -TSH with reflex  -Significant troponin elevation  -Monitor on telemetry  -Echocardiogram  -Cardiology consulted and following     Atrial fibrillation  -New diagnosis  -Control rates currently on no meds  -Will continue to monitor on telemetry  -Currently on IV heparin for the above    Ascending aortic aneurysm  -Patient had a CTA chest which showed a 4.5 cm ascending aortic aneurysm  -Currently without dissection  -Patient will need to follow-up with outpatient.    HTN  -Home medications  -Hydralazine as needed     HLD  -Statins     Ascending aortic aneurysm  -Follow-up 12 months recommended by radiology  discussed with patient     Quality:  DVT Prophylaxis: Heparin drip  CODE status: Full code  PARDEEP: TBD      Global A/P  -Plan for  cardiac cath today  -Will continue IV heparin at this time  -Discussed with the patient and family at the bedside  -Reviewed previous consultant notes  -Reviewed CBC, BMP, Mag, and Phos  -Reviewed tests ordered  -Repeat labs in am  -MDM: High, severe exacerbation of chronic illness posing a threat to life. IV medications requiring close inpatient monitoring.           Roni Rosas MD

## 2024-09-16 NOTE — PLAN OF CARE
Problem: Patient Centered Care  Goal: Patient preferences are identified and integrated in the patient's plan of care  Description: Interventions:  - What would you like us to know as we care for you? Home with   - Provide timely, complete, and accurate information to patient/family  - Incorporate patient and family knowledge, values, beliefs, and cultural backgrounds into the planning and delivery of care  - Encourage patient/family to participate in care and decision-making at the level they choose  - Honor patient and family perspectives and choices  Outcome: Progressing     Problem: Patient/Family Goals  Goal: Patient/Family Long Term Goal  Description: Patient's Long Term Goal: to be free from pain    Interventions:  - monitor pain  - See additional Care Plan goals for specific interventions  Outcome: Progressing  Goal: Patient/Family Short Term Goal  Description: Patient's Short Term Goal: to go home    Interventions:   - monitor vitals  - See additional Care Plan goals for specific interventions  Outcome: Progressing     Problem: CARDIOVASCULAR - ADULT  Goal: Maintains optimal cardiac output and hemodynamic stability  Description: INTERVENTIONS:  - Monitor vital signs, rhythm, and trends  - Monitor for bleeding, hypotension and signs of decreased cardiac output  - Evaluate effectiveness of vasoactive medications to optimize hemodynamic stability  - Monitor arterial and/or venous puncture sites for bleeding and/or hematoma  - Assess quality of pulses, skin color and temperature  - Assess for signs of decreased coronary artery perfusion - ex. Angina  - Evaluate fluid balance, assess for edema, trend weights  Outcome: Progressing  Goal: Absence of cardiac arrhythmias or at baseline  Description: INTERVENTIONS:  - Continuous cardiac monitoring, monitor vital signs, obtain 12 lead EKG if indicated  - Evaluate effectiveness of antiarrhythmic and heart rate control medications as ordered  - Initiate  emergency measures for life threatening arrhythmias  - Monitor electrolytes and administer replacement therapy as ordered  Outcome: Progressing     Pt is alert and oriented X 4. Pt on room air. No complaints throughout the day. Pt had a cath today, site is clean/dry/intact. Pt on IV fluids. Restart heparin at 2130 (see nursing communication). Safety precautions in place, call light within reach.

## 2024-09-16 NOTE — PROCEDURES
[facility]  part of [facility] Cardiac Cath Procedure Note  Jeannette Zhang Patient Status:  Inpatient    10/10/1940 MRN [id number]   Location [facility] 3W/SW Attending Roni Rosas MD   Hosp Day # 2 PCP Harpreet Pratt MD       Proceduralist: Anish Vizcaino DO  Date of Procedure: 2024     Preoperative Diagnosis:  1) NSTEMI    Postoperative Diagnosis:  1) coronary ectasia, slow flow  2) Severe first diagonal branch stenosis    Procedures Performed:  1) Selective Left and Right Coronary angiogram  2) Left Heart Catheterization  3) Successful PCI to first diagonal branch with 2.25 x 15 mm Xience Skypoint JONAS    Indication/History: Jeannette Zhang is a 83 year old female with history of hypertension, hyperlipidemia, atrial fibrillation, ascending aortic aneurysm 4.5 cm presenting with chest pressure referred for chronic catheterization with possible PCI indicated for NSTEMI with troponin of 4900.    Summary of Case: After written informed consent was obtained from the patient, patient was brought to the cardiac catheterization laboratory.  Patient was prepped and draped in the usual sterile fashion. Lidocaine 1% was used to infiltrate the right groin for local anesthesia and a 6 Czech introducer sheath was inserted into the right femoral artery using modified Seldinger technique.      Selective coronary angiography performed with 6 Czech JR4 catheter for RCA and 6 Czech JL 4 catheter for LCA.  Angiography performed in standard projections.      Selective right femoral angiogram done assess anatomy for closure.    Findings:  1) Left Ventriculography at 30 degrees MOULTON: 60%  2) Hemodynamics: LVEDP 25 mmHg  3) Coronaries:  Left main coronary artery: Large size vessel with  no angiographically significant disease.  Left anterior descending artery: Large, ectatic vessel with slow flow.  Type IV artery with mild disease in proximal segment.  Medium size first diagonal  branch has a 90% stenosis in the proximal segment.  Circumflex artery: Large size non-dominant vessel with luminal irregularities disease.  Right coronary artery: Large size dominant vessel with luminal irregularities.  Areas of ectasia and slow diffuse blood flow.     Specimen sent to: No specimen collected  Estimated blood loss: 10cc  Closure: Perclose      Lesion #1 first diagonal branch  Lesion Characteristics- nontorturous, noncalcified.  Type non-C lesion.  Pre-intervention stenosis 90%, Post intervention stenosis 0%.  Pre LUH 2, Post LUH 3.     Guide Catheter: 6 English EBU 3.75   Wire: Scion blue  Pre-dilation Balloon: 2 x 12 mm balloon  Stent: 2.25 x 15 mm  Xience nick point JONAS (inflated to 10 jaelyn to reach diameter 2.42 mm)      IV was maintained by RN and moderate conscious sedation of versed 1 mg and fentanyl 25 mg was given.  Patient was assessed and monitoring of oxygen, heart rate and blood pressure by nurse and myself during the exam for 38 minutes.      Assessment and Plan:  83-year-old female presenting with chest pressure and admitted for management of NSTEMI.  Troponin peaked at 4900 and she was referred for chronic elevation of possible PCI.  Today study shows slow blood flow in all coronary arteries due to coronary ectasia.  There is a 90% stenosis of the medium sized first diagonal branch which was treated with a single stent as detailed above  With very good angiographic results.  Heparin is used for anticoagulation and ACT was monitored.  Patient was loaded with Plavix 600 mg of the end of the case.  Will recommend aggressive lipid control, oral nitrates.  He will be on DOAC for atrial fibrillation, can continue aspirin plus Eliquis at time of discharge.    Anish Vizcaino DO  [facility]   Interventional Cardiac and Vascular Services      Anish Vizcaino DO  09/16/24

## 2024-09-17 VITALS
HEIGHT: 65 IN | RESPIRATION RATE: 16 BRPM | OXYGEN SATURATION: 95 % | SYSTOLIC BLOOD PRESSURE: 122 MMHG | BODY MASS INDEX: 31.76 KG/M2 | DIASTOLIC BLOOD PRESSURE: 87 MMHG | WEIGHT: 190.63 LBS | TEMPERATURE: 98 F | HEART RATE: 72 BPM

## 2024-09-17 LAB
ANION GAP SERPL CALC-SCNC: 8 MMOL/L (ref 0–18)
APTT PPP: 56.6 SECONDS (ref 23–36)
BASOPHILS # BLD AUTO: 0.06 X10(3) UL (ref 0–0.2)
BASOPHILS NFR BLD AUTO: 0.7 %
BUN BLD-MCNC: 11 MG/DL (ref 9–23)
BUN/CREAT SERPL: 13.8 (ref 10–20)
CALCIUM BLD-MCNC: 9.2 MG/DL (ref 8.7–10.4)
CHLORIDE SERPL-SCNC: 105 MMOL/L (ref 98–112)
CO2 SERPL-SCNC: 21 MMOL/L (ref 21–32)
CREAT BLD-MCNC: 0.8 MG/DL
DEPRECATED RDW RBC AUTO: 41 FL (ref 35.1–46.3)
EGFRCR SERPLBLD CKD-EPI 2021: 74 ML/MIN/1.73M2 (ref 60–?)
EOSINOPHIL # BLD AUTO: 0.09 X10(3) UL (ref 0–0.7)
EOSINOPHIL NFR BLD AUTO: 1 %
ERYTHROCYTE [DISTWIDTH] IN BLOOD BY AUTOMATED COUNT: 12.7 % (ref 11–15)
GLUCOSE BLD-MCNC: 92 MG/DL (ref 70–99)
HCT VFR BLD AUTO: 44.9 %
HGB BLD-MCNC: 15 G/DL
IMM GRANULOCYTES # BLD AUTO: 0.02 X10(3) UL (ref 0–1)
IMM GRANULOCYTES NFR BLD: 0.2 %
LYMPHOCYTES # BLD AUTO: 1.97 X10(3) UL (ref 1–4)
LYMPHOCYTES NFR BLD AUTO: 22.6 %
MAGNESIUM SERPL-MCNC: 2 MG/DL (ref 1.6–2.6)
MCH RBC QN AUTO: 29.4 PG (ref 26–34)
MCHC RBC AUTO-ENTMCNC: 33.4 G/DL (ref 31–37)
MCV RBC AUTO: 87.9 FL
MONOCYTES # BLD AUTO: 1.04 X10(3) UL (ref 0.1–1)
MONOCYTES NFR BLD AUTO: 11.9 %
NEUTROPHILS # BLD AUTO: 5.54 X10 (3) UL (ref 1.5–7.7)
NEUTROPHILS # BLD AUTO: 5.54 X10(3) UL (ref 1.5–7.7)
NEUTROPHILS NFR BLD AUTO: 63.6 %
OSMOLALITY SERPL CALC.SUM OF ELEC: 277 MOSM/KG (ref 275–295)
PHOSPHATE SERPL-MCNC: 4.8 MG/DL (ref 2.4–5.1)
PLATELET # BLD AUTO: 232 10(3)UL (ref 150–450)
POTASSIUM SERPL-SCNC: 3.9 MMOL/L (ref 3.5–5.1)
RBC # BLD AUTO: 5.11 X10(6)UL
SODIUM SERPL-SCNC: 134 MMOL/L (ref 136–145)
WBC # BLD AUTO: 8.7 X10(3) UL (ref 4–11)

## 2024-09-17 PROCEDURE — 99239 HOSP IP/OBS DSCHRG MGMT >30: CPT | Performed by: HOSPITALIST

## 2024-09-17 RX ORDER — NITROGLYCERIN 0.4 MG/1
0.4 TABLET SUBLINGUAL EVERY 5 MIN PRN
Qty: 30 TABLET | Refills: 0 | Status: SHIPPED | OUTPATIENT
Start: 2024-09-17

## 2024-09-17 RX ORDER — CLOPIDOGREL BISULFATE 75 MG/1
75 TABLET ORAL DAILY
Qty: 30 TABLET | Refills: 1 | Status: SHIPPED | OUTPATIENT
Start: 2024-09-18

## 2024-09-17 RX ORDER — PRAVASTATIN SODIUM 20 MG
20 TABLET ORAL NIGHTLY
Qty: 30 TABLET | Refills: 1 | Status: SHIPPED | OUTPATIENT
Start: 2024-09-17

## 2024-09-17 RX ORDER — HYDROCHLOROTHIAZIDE 12.5 MG/1
6.25 TABLET ORAL
Qty: 30 TABLET | Refills: 0 | Status: SHIPPED | OUTPATIENT
Start: 2024-09-18

## 2024-09-17 RX ORDER — AMLODIPINE BESYLATE 10 MG/1
10 TABLET ORAL DAILY
Qty: 30 TABLET | Refills: 1 | Status: SHIPPED | OUTPATIENT
Start: 2024-09-18

## 2024-09-17 RX ORDER — METOPROLOL TARTRATE 50 MG
50 TABLET ORAL
Qty: 60 TABLET | Refills: 1 | Status: SHIPPED | OUTPATIENT
Start: 2024-09-17

## 2024-09-17 NOTE — PLAN OF CARE
Problem: Patient Centered Care  Goal: Patient preferences are identified and integrated in the patient's plan of care  Description: Interventions:  - What would you like us to know as we care for you? Home with   - Provide timely, complete, and accurate information to patient/family  - Incorporate patient and family knowledge, values, beliefs, and cultural backgrounds into the planning and delivery of care  - Encourage patient/family to participate in care and decision-making at the level they choose  - Honor patient and family perspectives and choices  Outcome: Progressing     Problem: Patient/Family Goals  Goal: Patient/Family Long Term Goal  Description: Patient's Long Term Goal: to be free from pain    Interventions:  - monitor pain  - See additional Care Plan goals for specific interventions  Outcome: Progressing  Goal: Patient/Family Short Term Goal  Description: Patient's Short Term Goal: to go home    Interventions:   - monitor vitals  - See additional Care Plan goals for specific interventions  Outcome: Progressing     Problem: CARDIOVASCULAR - ADULT  Goal: Maintains optimal cardiac output and hemodynamic stability  Description: INTERVENTIONS:  - Monitor vital signs, rhythm, and trends  - Monitor for bleeding, hypotension and signs of decreased cardiac output  - Evaluate effectiveness of vasoactive medications to optimize hemodynamic stability  - Monitor arterial and/or venous puncture sites for bleeding and/or hematoma  - Assess quality of pulses, skin color and temperature  - Assess for signs of decreased coronary artery perfusion - ex. Angina  - Evaluate fluid balance, assess for edema, trend weights  Outcome: Progressing  Goal: Absence of cardiac arrhythmias or at baseline  Description: INTERVENTIONS:  - Continuous cardiac monitoring, monitor vital signs, obtain 12 lead EKG if indicated  - Evaluate effectiveness of antiarrhythmic and heart rate control medications as ordered  - Initiate  emergency measures for life threatening arrhythmias  - Monitor electrolytes and administer replacement therapy as ordered  Outcome: Progressing    Pt A&O 4. On room air. Ambulates independently. No acute complaints. Right groin dressing is clean, dry and intact with no visible drainage and no hematoma. Safety precautions in place.

## 2024-09-17 NOTE — PROGRESS NOTES
Went over discharge instructions with pt. Went over follow up appointments. Went over medications and when to take them next. Provided education on xarelto, plavix, afib, and post cath discharge instructions. Returned all bedside belongings. Removed IV and tele. Pt discharging. Provided pt with xarelto coupon. Provided pt with stent card.

## 2024-09-17 NOTE — PROGRESS NOTES
This RN called pt's pharmacy to check pricing.   Eliquis: pricing $489.56  Plavix: $1.00     [facility] notified. Xarelto pricing checked and it is $104

## 2024-09-17 NOTE — PLAN OF CARE
Problem: Patient Centered Care  Goal: Patient preferences are identified and integrated in the patient's plan of care  Description: Interventions:  - What would you like us to know as we care for you? Home with   - Provide timely, complete, and accurate information to patient/family  - Incorporate patient and family knowledge, values, beliefs, and cultural backgrounds into the planning and delivery of care  - Encourage patient/family to participate in care and decision-making at the level they choose  - Honor patient and family perspectives and choices  Outcome: Progressing     Problem: Patient/Family Goals  Goal: Patient/Family Long Term Goal  Description: Patient's Long Term Goal: to be free from pain    Interventions:  - monitor pain  - See additional Care Plan goals for specific interventions  Outcome: Progressing  Goal: Patient/Family Short Term Goal  Description: Patient's Short Term Goal: to go home    Interventions:   - monitor vitals  - See additional Care Plan goals for specific interventions  Outcome: Progressing     Problem: CARDIOVASCULAR - ADULT  Goal: Maintains optimal cardiac output and hemodynamic stability  Description: INTERVENTIONS:  - Monitor vital signs, rhythm, and trends  - Monitor for bleeding, hypotension and signs of decreased cardiac output  - Evaluate effectiveness of vasoactive medications to optimize hemodynamic stability  - Monitor arterial and/or venous puncture sites for bleeding and/or hematoma  - Assess quality of pulses, skin color and temperature  - Assess for signs of decreased coronary artery perfusion - ex. Angina  - Evaluate fluid balance, assess for edema, trend weights  Outcome: Progressing  Goal: Absence of cardiac arrhythmias or at baseline  Description: INTERVENTIONS:  - Continuous cardiac monitoring, monitor vital signs, obtain 12 lead EKG if indicated  - Evaluate effectiveness of antiarrhythmic and heart rate control medications as ordered  - Initiate  emergency measures for life threatening arrhythmias  - Monitor electrolytes and administer replacement therapy as ordered  Outcome: Progressing     Patient alert and oriented x4.  Call light within reach.  Right groin dressing clean, dry and intact, no hematoma, pedal pulses present.  Heparin gtt in progress.  Bed locked and in lowest position, bed alarm on.

## 2024-09-17 NOTE — DISCHARGE SUMMARY
[facility]  part of [facility]     Discharge Summary    Jeannette Zhang Patient Status:  Inpatient    10/10/1940 MRN [id number]   Location [facility] 3W/SW Attending Roni Rosas MD   Hosp Day # 3 PCP Harpreet Pratt MD     Date of Admission: 2024    Date of Discharge: 2024    Admitting Diagnosis: NSTEMI (non-ST elevated myocardial infarction) ([facility]) [I21.4]  Atrial fibrillation and flutter ([facility]) [I48.91, I48.92]    Discharge Diagnosis:   Patient Active Problem List   Diagnosis    NSTEMI (non-ST elevated myocardial infarction) ([facility])    Atrial fibrillation and flutter ([facility])       Reason for Admission:     NSTEMI    Physical Exam:     General: Patient is alert and oriented x3 does not appear to be in acute distress at this time  HEENT: EOMI PERRLA, atraumatic normocephalic  Cardiac: S1-S2 appreciated  Lungs: Good air entry bilaterally clear to auscultation  Abdomen: Soft nontender nondistended positive bowel sounds  Ext: Peripheral pulses are positive  Neuro: No focal deficits noted  Psych: Normal mood  Skin: No rashes noted  MSK: Full range of motion intact      Hospital Course:     Chest pain  NSTEMI/elevated troponin  New onset atrial fibrillation, rate controlled  -Admit  -Continue heparin drip per ACS protocol  -Continue to trend troponin  -TSH with reflex  -Significant troponin elevation  -Monitor on telemetry  -Echocardiogram  -Cardiology consulted and following     Atrial fibrillation  -New diagnosis  -Control rates currently on no meds  -Will continue to monitor on telemetry  -Currently on IV heparin for the above     Ascending aortic aneurysm  -Patient had a CTA chest which showed a 4.5 cm ascending aortic aneurysm  -Currently without dissection  -Patient will need to follow-up with outpatient.     HTN  -Home medications  -Hydralazine as needed     HLD  -Statins     Ascending aortic aneurysm  -Follow-up 12 months recommended by radiology discussed with  patient      PATIENT S/P PCI STENT PLACEMENT TO THE LAD  PATIENT TO BE DC ON PLAVIX AND XARELTO  F/U WITH PCP AND CARDS   D/W PATIENT IN DETAIL      History of Present Illness:     PER ADMITTING;   Jeannette Zhang is a 83 year old female with history of HTN, HLD, PMR who presented to the ED with complaints of chest pain.  Onset about 10 AM today while at rest.  Associated with mild difficulty in breathing.  She initially attributed pain to lesion on chest that was 'frozen' yesterday.  Pain persisted through the day, not exacerbated by activity.  Due to persistent, patient presented to the ED for further evaluation.  EKG showed rate controlled atrial fibrillation with nonspecific T wave abnormalities.  Labs significant for troponin 916  D-dimer 1.03  CTA chest: No PE.  4.5 cm ascending aortic aneurysm.  Mild interstitial edema.  Dilated main pulmonary artery as can be seen with pulmonary HTN.    Disposition: Home or Self Care    Discharge Condition:   STABLE    Discharge Medications:   Current Discharge Medication List        START taking these medications    Details   metoprolol tartrate 50 MG Oral Tab Take 1 tablet (50 mg total) by mouth 2x Daily(Beta Blocker).  Qty: 60 tablet, Refills: 1      hydroCHLOROthiazide 12.5 MG Oral Tab Take 0.5 tablets (6.25 mg total) by mouth Daily Beta Blocker.  Qty: 30 tablet, Refills: 0      clopidogrel 75 MG Oral Tab Take 1 tablet (75 mg total) by mouth daily.  Qty: 30 tablet, Refills: 1      nitroglycerin 0.4 MG Sublingual SL Tab Place 1 tablet (0.4 mg total) under the tongue every 5 (five) minutes as needed for Chest pain.  Qty: 30 tablet, Refills: 0      pravastatin 20 MG Oral Tab Take 1 tablet (20 mg total) by mouth nightly.  Qty: 30 tablet, Refills: 1      rivaroxaban 20 MG Oral Tab Take 1 tablet (20 mg total) by mouth nightly.  Qty: 30 tablet, Refills: 1           CONTINUE these medications which have CHANGED    Details   amLODIPine 10 MG Oral Tab Take 1 tablet (10 mg total)  by mouth daily.  Qty: 30 tablet, Refills: 1           CONTINUE these medications which have NOT CHANGED    Details   Cholecalciferol 50 MCG (2000 UT) Oral Tab Take 1 tablet (2,000 Units total) by mouth daily.      Calcium Carb-Cholecalciferol 500-10 MG-MCG Oral Tab Take 1 tablet by mouth daily.      Multiple Vitamin (MULTIVITAMINS OR) Take 1 tablet by mouth daily.      LOSARTAN 100 MG Oral Tab TAKE ONE TABLET BY MOUTH ONCE DAILY  Qty: 90 tablet, Refills: 0           STOP taking these medications       BISOPROLOL-HYDROCHLOROTHIAZIDE 5-6.25 MG Oral Tab              TOTAL DC TIME > 30 MIN    Roni Rosas MD  9/17/2024  12:57 PM     Hospital Discharge Diagnoses:  NSTEMI    Lace+ Score: 56  59-90 High Risk  29-58 Medium Risk  0-28   Low Risk.    TCM Follow-Up Recommendation:  LACE > 58: High Risk of readmission after discharge from the hospital.

## 2024-09-17 NOTE — PROGRESS NOTES
Progress Note  Jeannette Zhang Patient Status:  Inpatient    10/10/1940 MRN [id number]   Location [facility] 3W/SW Attending Roni Rosas MD   Hosp Day # 3 PCP Harpreet Pratt MD     Subjective:  S/p LHC with stent to first diagonal   Denies cardiac complaints    Objective:  /87 (BP Location: Left arm)   Pulse 75   Temp 97.8 °F (36.6 °C) (Oral)   Resp 16   Ht 5' 5\" (1.651 m)   Wt 190 lb 9.6 oz (86.5 kg)   SpO2 95%   BMI 31.72 kg/m²     Telemetry: AF, rate controlled    Intake/Output:    Intake/Output Summary (Last 24 hours) at 2024 1021  Last data filed at 2024 0600  Gross per 24 hour   Intake 341.46 ml   Output 800 ml   Net -458.54 ml     Last 3 Weights   24 0540 190 lb 9.6 oz (86.5 kg)   09/15/24 0403 195 lb 6.4 oz (88.6 kg)   24 2135 196 lb 9.6 oz (89.2 kg)   24 1705 195 lb (88.5 kg)   24 0617 195 lb (88.5 kg)   07/15/21 0927 200 lb (90.7 kg)     Labs:  Recent Labs   Lab 09/15/24  0638 09/15/24  1554 24  0559 24  0428   *  --  101* 92   BUN 11  --  9 11   CREATSERUM 0.80  --  0.79 0.80   EGFRCR 74  --  75 74   CA 9.0  --  9.7 9.2     --  138 134*   K 3.5 3.8 4.3  4.3 3.9     --  106 105   CO2 26.0  --  23.0 21.0     Recent Labs   Lab 24  1836 24  0559 24  0423   RBC 5.11 5.14 5.11   HGB 14.9 15.5 15.0   HCT 45.2 44.5 44.9   MCV 88.5 86.6 87.9   MCH 29.2 30.2 29.4   MCHC 33.0 34.8 33.4   RDW 12.6 12.7 12.7   NEPRELIM 5.54 5.92 5.54   WBC 7.8 8.6 8.7   .0 227.0 232.0     Recent Labs   Lab 24  2349 09/15/24  0638 09/15/24  1154   TROPHS 2,741* 4,930* 4,465*     Lab Results   Component Value Date/Time    HDL 41 2024 06:36 PM     (H) 2024 06:36 PM    TRIG 119 2024 06:36 PM     Lab Results   Component Value Date    DDIMER 1.03 (H) 2024     Lab Results   Component Value Date    TSH 2.547 2024     Review of Systems:   Constitutional: No fevers, chills,  fatigue or night sweats.  ENT: No mouth pain, neck pain, running nose, headaches or swollen glands.  Skin: No rashes, pruritus or skin changes,  Respiratory: Denies cough, wheezing or shortness of breath.  CV: Denies chest pain, palpitations, orthopnea, PND or dizziness.  Musculoskeletal: No joint pain, stiffness or swelling.  GI: No nausea, vomiting or diarrhea. No blood in stools.  Neurologic: No seizures, tremors, weakness or numbness.     Physical Exam:  General: Alert, cooperative, no distress, appears stated age.  Neck: Supple, symmetrical, trachea midline, no adenopathy, thyroid: no enlargment/tenderness/nodules, no carotid bruit and no JVD.  Lungs: Clear to auscultation bilaterally.  Chest wall: No tenderness or deformity.  Heart: Irregular rate and rhythm, S1, S2 normal, no murmur, click, rub or gallop.  Abdomen: Soft, non-tender. Bowel sounds normal. No masses,  No organomegaly.  Extremities: Extremities normal, atraumatic, no cyanosis or edema. Right groin c/d/I with minimal bruising, no bleeding, swelling, hematoma, or bruit on auscultation   Pulses: 2+ and symmetric all extremities.  Neurologic: Grossly intact.    Diagnostics:  Echo: 9/15/24  Conclusions:     1. Left ventricle: The cavity size was normal. Wall thickness was normal.      Systolic function was mildly reduced. The estimated ejection fraction was      50-55%, by visual assessment. Wall motion is normal; there are no      regional wall motion abnormalities. Unable to assess LV diastolic      function due to heart rhythm.   2. Left atrium: The left atrial volume was moderately increased.   3. Right atrium: The atrium was dilated.   4. Aortic valve: There was mild regurgitation.   5. Ascending aorta: The ascending aorta was mildly dilated.   6. Mitral valve: There was mild to moderate regurgitation.   Impressions:  No previous study was available for comparison.   *   Cardiac Cath: 9/16/24  Findings:  1) Left Ventriculography at 30 degrees  MOULTON: 60%  2) Hemodynamics: LVEDP 25 mmHg  3) Coronaries:  Left main coronary artery: Large size vessel with  no angiographically significant disease.  Left anterior descending artery: Large, ectatic vessel with slow flow.  Type IV artery with mild disease in proximal segment.  Medium size first diagonal branch has a 90% stenosis in the proximal segment.  Circumflex artery: Large size non-dominant vessel with luminal irregularities disease.  Right coronary artery: Large size dominant vessel with luminal irregularities.  Areas of ectasia and slow diffuse blood flow.      Specimen sent to: No specimen collected  Estimated blood loss: 10cc  Closure: Perclose        Lesion #1 first diagonal branch  Lesion Characteristics- nontorturous, noncalcified.  Type non-C lesion.  Pre-intervention stenosis 90%, Post intervention stenosis 0%.  Pre LUH 2, Post LUH 3.      Guide Catheter: 6 Pashto EBU 3.75   Wire: Scion blue  Pre-dilation Balloon: 2 x 12 mm balloon  Stent: 2.25 x 15 mm  Xience nick point JONAS (inflated to 10 jaelyn to reach diameter 2.42 mm)        IV was maintained by RN and moderate conscious sedation of versed 1 mg and fentanyl 25 mg was given.  Patient was assessed and monitoring of oxygen, heart rate and blood pressure by nurse and myself during the exam for 38 minutes.        Assessment and Plan:  83-year-old female presenting with chest pressure and admitted for management of NSTEMI.  Troponin peaked at 4900 and she was referred for chronic elevation of possible PCI.  Today study shows slow blood flow in all coronary arteries due to coronary ectasia.  There is a 90% stenosis of the medium sized first diagonal branch which was treated with a single stent as detailed above  With very good angiographic results.  Heparin is used for anticoagulation and ACT was monitored.  Patient was loaded with Plavix 600 mg of the end of the case.  Will recommend aggressive lipid control, oral nitrates.  He will be on DOAC for  atrial fibrillation, can continue aspirin plus Eliquis at time of discharge.     Medications:   clopidogrel  75 mg Oral Daily    aspirin  81 mg Oral Daily    pravastatin  20 mg Oral Nightly    amLODIPine  10 mg Oral Daily    metoprolol tartrate  50 mg Oral 2x Daily(Beta Blocker)    And    hydrochlorothiazide  6.25 mg Oral Daily Beta Blocker    losartan  100 mg Oral Daily      continuous dose heparin 1,150 Units/hr (09/17/24 0708)     Assessment:    CAD  Admitted with chest pain, found to have nonspecific EKG changes and elevated troponin   -C on 9/16  revealed 90% stenosis to first diagonal, s/p PCI with JONAS  -asa, statin, BB, ARB,  plavix  -    NSTEMI  Type 2 NSTEMI with chest pain on admission, nonspecific EKG changes, elevated troponin   -trop 916 on admission, peaked at 4,930  -[facility] yesterday as above, s/p PCI  -Normal LV function 50-55%    New onset Atrial fibrillation   Rates controlled, on heparin gtt  -plan for rate  control and DOAC, will discuss rate control vs rhythm control strategies as outpatient  -stop heparin gtt with first dose of eliquis this morning  -TSH normal 2.547  -[id number]= 5 for age, gender,  htn, CAD    HLD  LDL elevated 123  -rosuvastatin just added this admission  -recheck lipid panel in 2 months    Hypnatremia  Na 134  -on hydrochlorothiazide which she was taking at home also, will monitor Na level with repeat BMP, if remains elevated, may need to stop hydrochlorothiazide  -limit free water fluids      Plan:  -Start eliquis 5mg BID, stop heparin gtt with first PO dose eliquis  -Continue statin, plavix, amlodipine, losartan, lopressor, hydrochlorothiazide  -Stop asa to avoid triple therapy  -Recheck BMP in one week to monitor sodium level   -Stable for discharge from cardiac standpoint, we will arrange for outpatient follow up      Plan of care discussed with patient, RN.        RUDDY Rodriguez  9/17/2024  10:21 AM  [phone] [place]  [phone]  [place]        Addendum: eliquis price check performed by RN, cost is $488/month. Xarelto priced by AP[facility], $108/month. She states this is affordable. Will switch DOAC to xarelto 20mg daily with dinner, start first dose tonight. Will discontinue eliquis.   11:59 AM      Cardiologist Addendum:  Jeannette Zhang was seen and examined independently and I agree with the above documentation provided by DIANE Veloz.  Patient stable after catheterization yesterday.  Can discontinue aspirin at time of discharge, continue Xarelto, Plavix.  Tolerating Lopressor 50 mg twice daily for rate control.  Monitor for myalgias on pravastatin, if fails statin therapy again then will need PCSK9 inhibitor.  Follow-up with Dr. Porter in clinic after discharge.    Thank you for allowing me to take part in the care of Jeannette Zhang. Please call with any questions of concerns.    L3    Anish Vizcaino DO  [facility]   Interventional Cardiac [facility] Vascular Services      September 17, 2024  12:03 PM

## 2024-09-17 NOTE — DISCHARGE PLANNING
I called the pharmacy below to inquire about the copay of Xarelto before patient discharges.    [id number] - [place], [place] - [address]     The patient's copay is $400 for 30 day supply. 30 day free trial coupon information given to pharmacy over the phone and Xarelto will be no charge today. It is in stock and will be ready for  today. Primary RN Justyna and Chetna FOX notified.     Trish WOLF, Discharge Leader [phone]

## 2024-09-20 NOTE — CDS QUERY
How to answer this Query:    1.) DON'T CLICK COSIGN BUTTON FIRST  2.) Click \"3 dots...\" to the right of cosign button and click EDIT on the toolbar.  2.) Type an \"X\" in the bracket for the diagnosis that applies. (You may also add additional clinical details as you feel necessary to substantiate your response).   3.) Finally click \"Sign\" to complete response.  Thank You    CLINICAL DOCUMENTATION CLARIFICATION  Dear Doctor:SANGITA    CAN THE ATRIAL FLUTTER BE FURTHER CLARIFIED/VALIDATED?      ( )  AFIB WITHOUT ATRIAL FLUTTER    ( )  Other (please specify):         RISK FACTORS: NEW ONSET AFIB  CLINICAL INDICATORS: ED DOCUMENTATION-EKG'S -9/14 ATRIAL FIBRILLATION   TREATMENTS: CARDIOLOGY CONSULT, LABS, DOAC, MONITOR    If you have any questions, please contact Clinical Documentation  Specialist:  Ingrid FLORES RN at [phone]     Thank You!     THIS FORM IS A PERMANENT PART OF THE MEDICAL RECORD

## 2024-09-29 ENCOUNTER — HOSPITAL ENCOUNTER (EMERGENCY)
Facility: HOSPITAL | Age: 84
Discharge: HOME | End: 2024-09-29
Attending: EMERGENCY MEDICINE
Payer: MEDICARE

## 2024-09-29 VITALS
OXYGEN SATURATION: 98 % | SYSTOLIC BLOOD PRESSURE: 122 MMHG | BODY MASS INDEX: 32 KG/M2 | RESPIRATION RATE: 18 BRPM | DIASTOLIC BLOOD PRESSURE: 73 MMHG | WEIGHT: 190.69 LBS | HEART RATE: 78 BPM | TEMPERATURE: 98 F

## 2024-09-29 DIAGNOSIS — R04.0 EPISTAXIS: Primary | ICD-10-CM

## 2024-09-29 LAB
ANION GAP SERPL CALC-SCNC: 8 MMOL/L (ref 0–18)
APTT PPP: 53.7 SECONDS (ref 23–36)
BASOPHILS # BLD AUTO: 0.09 X10(3) UL (ref 0–0.2)
BASOPHILS NFR BLD AUTO: 1.2 %
BUN BLD-MCNC: 30 MG/DL (ref 9–23)
BUN/CREAT SERPL: 25.2 (ref 10–20)
CALCIUM BLD-MCNC: 9.4 MG/DL (ref 8.7–10.4)
CHLORIDE SERPL-SCNC: 105 MMOL/L (ref 98–112)
CO2 SERPL-SCNC: 24 MMOL/L (ref 21–32)
CREAT BLD-MCNC: 1.19 MG/DL
DEPRECATED RDW RBC AUTO: 40.5 FL (ref 35.1–46.3)
EGFRCR SERPLBLD CKD-EPI 2021: 46 ML/MIN/1.73M2 (ref 60–?)
EOSINOPHIL # BLD AUTO: 0.14 X10(3) UL (ref 0–0.7)
EOSINOPHIL NFR BLD AUTO: 1.9 %
ERYTHROCYTE [DISTWIDTH] IN BLOOD BY AUTOMATED COUNT: 12.6 % (ref 11–15)
GLUCOSE BLD-MCNC: 112 MG/DL (ref 70–99)
HCT VFR BLD AUTO: 41.1 %
HGB BLD-MCNC: 13.8 G/DL
IMM GRANULOCYTES # BLD AUTO: 0.02 X10(3) UL (ref 0–1)
IMM GRANULOCYTES NFR BLD: 0.3 %
INR BLD: 2.91 (ref 0.8–1.2)
LYMPHOCYTES # BLD AUTO: 1.27 X10(3) UL (ref 1–4)
LYMPHOCYTES NFR BLD AUTO: 16.8 %
MCH RBC QN AUTO: 29.7 PG (ref 26–34)
MCHC RBC AUTO-ENTMCNC: 33.6 G/DL (ref 31–37)
MCV RBC AUTO: 88.4 FL
MONOCYTES # BLD AUTO: 0.74 X10(3) UL (ref 0.1–1)
MONOCYTES NFR BLD AUTO: 9.8 %
NEUTROPHILS # BLD AUTO: 5.28 X10 (3) UL (ref 1.5–7.7)
NEUTROPHILS # BLD AUTO: 5.28 X10(3) UL (ref 1.5–7.7)
NEUTROPHILS NFR BLD AUTO: 70 %
OSMOLALITY SERPL CALC.SUM OF ELEC: 291 MOSM/KG (ref 275–295)
PLATELET # BLD AUTO: 244 10(3)UL (ref 150–450)
POTASSIUM SERPL-SCNC: 4.1 MMOL/L (ref 3.5–5.1)
PROTHROMBIN TIME: 32.2 SECONDS (ref 11.6–14.8)
RBC # BLD AUTO: 4.65 X10(6)UL
SODIUM SERPL-SCNC: 137 MMOL/L (ref 136–145)
WBC # BLD AUTO: 7.5 X10(3) UL (ref 4–11)

## 2024-09-29 PROCEDURE — 85025 COMPLETE CBC W/AUTO DIFF WBC: CPT | Performed by: EMERGENCY MEDICINE

## 2024-09-29 PROCEDURE — 30901 CONTROL OF NOSEBLEED: CPT

## 2024-09-29 PROCEDURE — 80048 BASIC METABOLIC PNL TOTAL CA: CPT | Performed by: EMERGENCY MEDICINE

## 2024-09-29 PROCEDURE — 85730 THROMBOPLASTIN TIME PARTIAL: CPT | Performed by: EMERGENCY MEDICINE

## 2024-09-29 PROCEDURE — 36415 COLL VENOUS BLD VENIPUNCTURE: CPT

## 2024-09-29 PROCEDURE — 85610 PROTHROMBIN TIME: CPT | Performed by: EMERGENCY MEDICINE

## 2024-09-29 PROCEDURE — 99283 EMERGENCY DEPT VISIT LOW MDM: CPT

## 2024-09-29 PROCEDURE — 99284 EMERGENCY DEPT VISIT MOD MDM: CPT

## 2024-09-29 RX ORDER — TRANEXAMIC ACID 100 MG/ML
5 INJECTION, SOLUTION INTRAVENOUS ONCE
Status: COMPLETED | OUTPATIENT
Start: 2024-09-29 | End: 2024-09-29

## 2024-09-29 RX ORDER — OXYMETAZOLINE HYDROCHLORIDE 0.05 G/100ML
1 SPRAY NASAL ONCE
Status: COMPLETED | OUTPATIENT
Start: 2024-09-29 | End: 2024-09-29

## 2024-09-29 NOTE — DISCHARGE INSTRUCTIONS
Stop taking Eliquis.  Continue with your Xarelto and Plavix and the rest of your medication as prescribed.  Follow-up with ENT as soon as possible.  Call to schedule an appointment.  Follow-up with your primary care provider in 1 to 2 days.  Return to the ER if symptoms continue, get worse, unable to follow-up

## 2024-09-29 NOTE — ED INITIAL ASSESSMENT (HPI)
PT c/o epistaxis since 0100.  Bleeding appears controlled with clamp applied by ER.  PT states she has required cauterization in the past.

## 2024-09-29 NOTE — ED PROVIDER NOTES
Patient Seen in: [facility] Emergency Department    History     Chief Complaint   Patient presents with    Nose Bleed       HPI    83-year-old female with a history of hypertension, hyperlipidemia, recent discharge for hospital approximately 2 weeks ago after being admitted for new onset A-fib and NSTEMI which she received coronary stent.  Came in today complaining of a nosebleed that started approximately 3 hours ago around 1 AM.  Patient states she had 1 earlier this week that resolved on its own.  Patient is  Also taking both Eliquis and Xarelto?  No bleeding from anywhere else no change in her bowel movements.  No chest pain, shortness breath, abdominal pain    History reviewed.   Past Medical History:    Essential hypertension    Hyperlipidemia       History reviewed.   Past Surgical History:   Procedure Laterality Date    Colonoscopy,biopsy  11/13/2012    Procedure: COLONOSCOPY, POSSIBLE BIOPSY, POSSIBLE POLYPECTOMY 78071;  Surgeon: Jan Snyder MD;  Location: [facility]    Patient documented not to have experienced any of the following events  11/13/2012    Procedure: ESOPHAGOGASTRODUODENOSCOPY, COLONOSCOPY, POSSIBLE BIOPSY, POSSIBLE POLYPECTOMY 22222,22295;  Surgeon: Jan Snyder MD;  Location: [facility]    Patient withough preoperative order for iv antibiotic surgical site infection prophylaxis.  11/13/2012    Procedure: ESOPHAGOGASTRODUODENOSCOPY, COLONOSCOPY, POSSIBLE BIOPSY, POSSIBLE POLYPECTOMY 35898,00506;  Surgeon: Jan Snyder MD;  Location: [facility]         Medications :  (Not in a hospital admission)       History reviewed. No pertinent family history.    Smoking Status:   Social History     Socioeconomic History    Marital status:    Tobacco Use    Smoking status: Former     Current packs/day: 0.00     Average packs/day: 0.5 packs/day for 20.0 years (10.0 ttl pk-yrs)     Types: Cigarettes     Start date: 10/10/1987     Quit date:  10/10/2007     Years since quittin.9    Smokeless tobacco: Never   Vaping Use    Vaping status: Never Used   Substance and Sexual Activity    Alcohol use: Not Currently    Drug use: Never       Constitutional and vital signs reviewed.      Social History and Family History elements reviewed from today, pertinent positives to the presenting problem noted.    Physical Exam     ED Triage Vitals [24 0359]   /74   Pulse 90   Resp 18   Temp 98.4 °F (36.9 °C)   Temp src Temporal   SpO2 96 %   O2 Device None (Room air)       All measures to prevent infection transmission during my interaction with the patient were taken. Handwashing was performed prior to and after the exam.  Stethoscope and any equipment used during my examination was cleaned with super sani-cloth germicidal wipes following the exam.     Physical Exam  Vitals and nursing note reviewed.   HENT:      Nose:      Comments: Dried blood seen in the left nare.  No active bleeding at this time.  Right nares clear  Cardiovascular:      Rate and Rhythm: Normal rate.   Pulmonary:      Effort: Pulmonary effort is normal.   Abdominal:      Palpations: Abdomen is soft.   Skin:     General: Skin is warm.      Capillary Refill: Capillary refill takes less than 2 seconds.   Neurological:      General: No focal deficit present.      Mental Status: She is alert.         ED Course        Labs Reviewed   BASIC METABOLIC PANEL (8) - Abnormal; Notable for the following components:       Result Value    Glucose 112 (*)     BUN 30 (*)     Creatinine 1.19 (*)     BUN/CREA Ratio 25.2 (*)     eGFR-Cr 46 (*)     All other components within normal limits   PROTHROMBIN TIME (PT) - Abnormal; Notable for the following components:    PT 32.2 (*)     INR 2.91 (*)     All other components within normal limits   PTT, ACTIVATED - Abnormal; Notable for the following components:    PTT 53.7 (*)     All other components within normal limits   CBC WITH DIFFERENTIAL WITH PLATELET        As Interpreted by me    Imaging Results Available and Reviewed while in ED: No results found.  ED Medications Administered:   Medications   oxymetazoline (Nasal Decongestant) 0.05 % nasal solution 1 spray (1 spray Nasal Given by Other 9/29/24 3918)   silver nitrate-potassium nitrate 75-25% (Arzol Silver Nitrate) 75-25 % external applicator 1 each (1 each Topical Given by Other 9/29/24 4238)   tranexamic acid (Cyklokapron) 1000 MG/10ML for TOPICAL use 500 mg (500 mg Topical Given by Other 9/29/24 0646)         MDM     Vitals:    09/29/24 0359   BP: 115/74   Pulse: 90   Resp: 18   Temp: 98.4 °F (36.9 °C)   TempSrc: Temporal   SpO2: 96%   Weight: 86.5 kg     *I personally reviewed and interpreted all ED vitals.    Pulse Ox: 96%, Room air, Normal     Monitor Interpretation:   normal sinus rhythm as interpreted by me.  The cardiac monitor was ordered to monitor cardiac rate and rhythm.    Differential Diagnosis/ Diagnostic Considerations: Epistaxis, URI, supratherapeutic INR    Complicating Factors: The patient already has does not have any pertinent problems on file. to contribute to the complexity of this ED evaluation.    Medical Decision Making  Amount and/or Complexity of Data Reviewed  Labs: ordered. Decision-making details documented in ED Course.    Risk  Prescription drug management.      I reviewed patient's chart to see why she should be on Eliquis and Xarelto.  After doing a chart review it looks like her cardiology clinic was pricing both Eliquis and Xarelto and found out that Eliquis was cheaper.  Patient was initially on Eliquis and was supposed to switch to Xarelto and stop the Eliquis.  Did explain to the patient detail that she needs to stop taking her Eliquis but continue with the Xarelto as prescribed.  Patient's daughter at the bedside and also understands these instructions.  Will continue to wait for lab results prior to disposition    Afrin was applied initially to bilateral nares.  With a  nasal clip.  Still having very small amounts drops of blood.  Patient clear left nare again of all blood clots and then took silver nitrate sticks and cauterized inside nare specks for second get.  Reapplied nasal clip and will reevaluate    Labs with patient and daughter at the bedside.  Patient's INR is slightly elevated otherwise nothing else acute.  Patient has a couple drops of bleeding.  Reapplied Afrin, tried cauterizing with silver nitrate sticks and also  sprayed with TXA .  Will reevaluate afterwards    No bleeding after short observation period.  Explained to the patient will be able to discharge home.  Using Afrin as needed.  Given ENT referral call to make an appointment.  Follow-up with your primary care provider in 1 to 2 days.  Stop taking Eliquis continue with the rest of your medication including Xarelto.  Return to the ER if symptoms continue, get worse, unable to follow-up  Condition upon leaving the department: Stable    Disposition and Plan     Clinical Impression:  1. Epistaxis        Disposition:  Discharge    Follow-up:  Harpreet Pratt MD  [address] [facility] [address] [phone]    Follow up      Ji Smith MD  [address] [facility] [address] [place] [address] [id number] [phone]    Follow up        Medications Prescribed:  Current Discharge Medication List

## 2024-10-07 ENCOUNTER — ORDER TRANSCRIPTION (OUTPATIENT)
Dept: CARDIAC REHAB | Facility: HOSPITAL | Age: 84
End: 2024-10-07

## 2024-10-07 DIAGNOSIS — Z98.61 S/P PTCA (PERCUTANEOUS TRANSLUMINAL CORONARY ANGIOPLASTY): Primary | ICD-10-CM

## 2024-10-14 ENCOUNTER — CARDPULM VISIT (OUTPATIENT)
Dept: CARDIAC REHAB | Facility: HOSPITAL | Age: 84
End: 2024-10-14
Attending: INTERNAL MEDICINE
Payer: MEDICARE

## 2024-10-14 DIAGNOSIS — Z98.61 S/P PTCA (PERCUTANEOUS TRANSLUMINAL CORONARY ANGIOPLASTY): Primary | ICD-10-CM

## 2024-10-16 ENCOUNTER — HOSPITAL ENCOUNTER (EMERGENCY)
Facility: HOSPITAL | Age: 84
Discharge: HOME | End: 2024-10-16
Attending: EMERGENCY MEDICINE
Payer: MEDICARE

## 2024-10-16 ENCOUNTER — APPOINTMENT (OUTPATIENT)
Dept: GENERAL RADIOLOGY | Facility: HOSPITAL | Age: 84
End: 2024-10-16
Attending: EMERGENCY MEDICINE
Payer: MEDICARE

## 2024-10-16 VITALS
TEMPERATURE: 97 F | RESPIRATION RATE: 18 BRPM | HEART RATE: 84 BPM | SYSTOLIC BLOOD PRESSURE: 124 MMHG | DIASTOLIC BLOOD PRESSURE: 78 MMHG | OXYGEN SATURATION: 97 %

## 2024-10-16 DIAGNOSIS — R04.0 EPISTAXIS: Primary | ICD-10-CM

## 2024-10-16 DIAGNOSIS — R42 LIGHTHEADEDNESS: ICD-10-CM

## 2024-10-16 LAB
ANION GAP SERPL CALC-SCNC: 8 MMOL/L (ref 0–18)
ANTIBODY SCREEN: NEGATIVE
BASOPHILS # BLD AUTO: 0.07 X10(3) UL (ref 0–0.2)
BASOPHILS NFR BLD AUTO: 0.7 %
BUN BLD-MCNC: 26 MG/DL (ref 9–23)
BUN/CREAT SERPL: 25 (ref 10–20)
CALCIUM BLD-MCNC: 9.2 MG/DL (ref 8.7–10.4)
CHLORIDE SERPL-SCNC: 109 MMOL/L (ref 98–112)
CO2 SERPL-SCNC: 24 MMOL/L (ref 21–32)
CREAT BLD-MCNC: 1.04 MG/DL
DEPRECATED RDW RBC AUTO: 44.3 FL (ref 35.1–46.3)
EGFRCR SERPLBLD CKD-EPI 2021: 53 ML/MIN/1.73M2 (ref 60–?)
EOSINOPHIL # BLD AUTO: 0.02 X10(3) UL (ref 0–0.7)
EOSINOPHIL NFR BLD AUTO: 0.2 %
ERYTHROCYTE [DISTWIDTH] IN BLOOD BY AUTOMATED COUNT: 13.3 % (ref 11–15)
GLUCOSE BLD-MCNC: 126 MG/DL (ref 70–99)
HCT VFR BLD AUTO: 33.1 %
HGB BLD-MCNC: 10.8 G/DL
IMM GRANULOCYTES # BLD AUTO: 0.06 X10(3) UL (ref 0–1)
IMM GRANULOCYTES NFR BLD: 0.6 %
LYMPHOCYTES # BLD AUTO: 0.81 X10(3) UL (ref 1–4)
LYMPHOCYTES NFR BLD AUTO: 8.2 %
MCH RBC QN AUTO: 29.6 PG (ref 26–34)
MCHC RBC AUTO-ENTMCNC: 32.6 G/DL (ref 31–37)
MCV RBC AUTO: 90.7 FL
MONOCYTES # BLD AUTO: 0.44 X10(3) UL (ref 0.1–1)
MONOCYTES NFR BLD AUTO: 4.4 %
NEUTROPHILS # BLD AUTO: 8.49 X10 (3) UL (ref 1.5–7.7)
NEUTROPHILS # BLD AUTO: 8.49 X10(3) UL (ref 1.5–7.7)
NEUTROPHILS NFR BLD AUTO: 85.9 %
OSMOLALITY SERPL CALC.SUM OF ELEC: 298 MOSM/KG (ref 275–295)
PLATELET # BLD AUTO: 240 10(3)UL (ref 150–450)
POTASSIUM SERPL-SCNC: 4.8 MMOL/L (ref 3.5–5.1)
Q-T INTERVAL: 404 MS
QRS DURATION: 82 MS
QTC CALCULATION (BEZET): 463 MS
R AXIS: 39 DEGREES
RBC # BLD AUTO: 3.65 X10(6)UL
RH BLOOD TYPE: POSITIVE
RH BLOOD TYPE: POSITIVE
SODIUM SERPL-SCNC: 141 MMOL/L (ref 136–145)
T AXIS: 52 DEGREES
TROPONIN I SERPL HS-MCNC: 3 NG/L
TROPONIN I SERPL HS-MCNC: 3 NG/L
VENTRICULAR RATE: 79 BPM
WBC # BLD AUTO: 9.9 X10(3) UL (ref 4–11)

## 2024-10-16 PROCEDURE — 86850 RBC ANTIBODY SCREEN: CPT | Performed by: EMERGENCY MEDICINE

## 2024-10-16 PROCEDURE — 86900 BLOOD TYPING SEROLOGIC ABO: CPT | Performed by: EMERGENCY MEDICINE

## 2024-10-16 PROCEDURE — 93005 ELECTROCARDIOGRAM TRACING: CPT

## 2024-10-16 PROCEDURE — 71045 X-RAY EXAM CHEST 1 VIEW: CPT | Performed by: EMERGENCY MEDICINE

## 2024-10-16 PROCEDURE — 86901 BLOOD TYPING SEROLOGIC RH(D): CPT | Performed by: EMERGENCY MEDICINE

## 2024-10-16 PROCEDURE — 99284 EMERGENCY DEPT VISIT MOD MDM: CPT

## 2024-10-16 PROCEDURE — 93010 ELECTROCARDIOGRAM REPORT: CPT

## 2024-10-16 PROCEDURE — 96360 HYDRATION IV INFUSION INIT: CPT

## 2024-10-16 PROCEDURE — 80048 BASIC METABOLIC PNL TOTAL CA: CPT | Performed by: EMERGENCY MEDICINE

## 2024-10-16 PROCEDURE — 85025 COMPLETE CBC W/AUTO DIFF WBC: CPT | Performed by: EMERGENCY MEDICINE

## 2024-10-16 PROCEDURE — 84484 ASSAY OF TROPONIN QUANT: CPT | Performed by: EMERGENCY MEDICINE

## 2024-10-16 PROCEDURE — 99285 EMERGENCY DEPT VISIT HI MDM: CPT

## 2024-10-16 PROCEDURE — 96361 HYDRATE IV INFUSION ADD-ON: CPT

## 2024-10-16 NOTE — ED PROVIDER NOTES
Patient Seen in: [facility] Emergency Department      History     Chief Complaint   Patient presents with    Epistaxis    Hypotension     Stated Complaint: nose bleeds and low BP    Subjective:   HPI      84-year-old female history of CAD, A-fib on anticoagulation presents with left-sided nosebleed.  Patient states she was feeling well until about 6 hours prior to arrival when she developed left-sided nosebleed.  She used Afrin, bleed has been slower since then, but reports feeling lightheaded.  She now states she feels much better, denies infectious symptoms such as cough, dysuria, chest pain, abdominal pain    Objective:     Past Medical History:    Essential hypertension    Heart attack (HCC)    Hyperlipidemia              Past Surgical History:   Procedure Laterality Date    Colonoscopy,biopsy  2012    Procedure: COLONOSCOPY, POSSIBLE BIOPSY, POSSIBLE POLYPECTOMY 60010;  Surgeon: Jan Snyder MD;  Location: [facility]    Patient documented not to have experienced any of the following events  2012    Procedure: ESOPHAGOGASTRODUODENOSCOPY, COLONOSCOPY, POSSIBLE BIOPSY, POSSIBLE POLYPECTOMY 96938,85173;  Surgeon: Jan Snyder MD;  Location: [facility]    Patient withough preoperative order for iv antibiotic surgical site infection prophylaxis.  2012    Procedure: ESOPHAGOGASTRODUODENOSCOPY, COLONOSCOPY, POSSIBLE BIOPSY, POSSIBLE POLYPECTOMY 48483,57774;  Surgeon: Jan Snyder MD;  Location: [facility]                Social History     Socioeconomic History    Marital status:    Tobacco Use    Smoking status: Former     Current packs/day: 0.00     Average packs/day: 0.5 packs/day for 20.0 years (10.0 ttl pk-yrs)     Types: Cigarettes     Start date: 10/10/1987     Quit date: 10/10/2007     Years since quittin.0    Smokeless tobacco: Never   Vaping Use    Vaping status: Never Used   Substance and Sexual Activity    Alcohol use: Not  Currently    Drug use: Never     Social Drivers of Health     Financial Resource Strain: Low Risk  (4/16/2024)    Received from [facility]    Overall Financial Resource Strain (CARDIA)     Difficulty of Paying Living Expenses: Not hard at all   Food Insecurity: No Food Insecurity (9/14/2024)    Food Insecurity     Food Insecurity: Never true   Transportation Needs: No Transportation Needs (9/14/2024)    Transportation Needs     Lack of Transportation: No   Housing Stability: Low Risk  (9/14/2024)    Housing Stability     Housing Instability: No                  Physical Exam     ED Triage Vitals [10/16/24 1526]   BP 95/69   Pulse 79   Resp 20   Temp 97.3 °F (36.3 °C)   Temp src    SpO2 95 %   O2 Device        Current Vitals:   Vital Signs  BP: 115/80  Pulse: 69  Resp: 17  Temp: 97.3 °F (36.3 °C)  MAP (mmHg): 90    Oxygen Therapy  SpO2: 92 %        Physical Exam  Vital signs reviewed. Nursing note reviewed.  Constitutional: Well-developed. Well-nourished. In no acute distress  HENT: Mucous membranes moist.  Dried blood left nostril, no source of bleeding identified.  No oropharyngeal blood noted  EYES: No scleral icterus or conjunctival injection.  NECK: Full ROM. Supple.   CARDIAC: Normal rate. Normal S1/ S2. 2+ distal pulses. No edema  PULM/CHEST: Clear to auscultation bilaterally. No wheezes  ABD: Soft, non-tender, non-distended.   RECTAL: deferred  Extremities: No obvious deformity  NEURO: Awake, alert, following commands, moving extremities, answering questions.   SKIN: Warm and dry. No rash or lesions.  PSYCH: Normal judgment. Normal affect.        ED Course     Labs Reviewed   CBC WITH DIFFERENTIAL WITH PLATELET - Abnormal; Notable for the following components:       Result Value    RBC 3.65 (*)     HGB 10.8 (*)     HCT 33.1 (*)     Neutrophil Absolute Prelim 8.49 (*)     Neutrophil Absolute 8.49 (*)     Lymphocyte Absolute 0.81 (*)     All other components within normal limits   BASIC  METABOLIC PANEL (8) - Abnormal; Notable for the following components:    Glucose 126 (*)     BUN 26 (*)     Creatinine 1.04 (*)     BUN/CREA Ratio 25.0 (*)     Calculated Osmolality 298 (*)     eGFR-Cr 53 (*)     All other components within normal limits   TROPONIN I HIGH SENSITIVITY - Normal   TROPONIN I HIGH SENSITIVITY - Normal   TYPE AND SCREEN    Narrative:     The following orders were created for panel order Type and screen.  Procedure                               Abnormality         Status                     ---------                               -----------         ------                     ABORH (Blood Type)[[id number]]                               Final result               Antibody Screen[[id number]]                                  Final result                 Please view results for these tests on the individual orders.   ABORH (BLOOD TYPE)   ANTIBODY SCREEN   ABORH CONFIRMATION     EKG    Rate, intervals and axes as noted on EKG Report.  Rate: 79  Rhythm: Atrial Fibrillation  Reading: Specific T wave changes                       MDM      Assessment:Patient is a 84 year old female presenting to the ED due to nosebleed, lightheadedness.    Comorbidities/chronic illnesses impacting care: CAD    History obtained from: patient    Laboratory results above were independently viewed and interpreted by me.  External records and previous hospitalization records reviewed and documented below      Radiography/Imaging:    XR CHEST AP PORTABLE  (CPT=71045)   Final Result   PROCEDURE: XR CHEST AP PORTABLE  (CPT=71045)   TIME: 4:41 p.m.         COMPARISON: [facility], XR CHEST AP PORTABLE (CPT=71045),    9/14/2024, 6:21 PM.       INDICATIONS: Shortness of breath and cough for 3 days       TECHNIQUE:   Single view.         FINDINGS:    CARDIAC/VASC: No cardiac silhouette abnormality or cardiomegaly.     Unremarkable pulmonary vasculature.    MEDIAST/INGRIS:   Atherosclerotic aorta with no visible  aneurysm.     LUNGS/PLEURA: No significant pulmonary parenchymal abnormalities.  No    effusion or pleural thickening.   BONES: Scattered mild degenerative endplate changes in the visualized    thoracolumbar spine.   OTHER: Negative.                     =====   CONCLUSION: No acute cardiopulmonary abnormality.           Dictated by (CST): Chris Arango MD on 10/16/2024 at 5:17 PM        Finalized by (CST): Chris Arango MD on 10/16/2024 at 5:17 PM                 Imaging result above were independently viewed and interpreted by me.    Consideration of Social Determinants of Health and Impact on Medical Decision Making:  Housing/Transportation/Financial Strain/Access to healthcare/Food insecurity/family or Community support/Language and Literacy/Substance abuse/Mental health concerns/Disabilities     -none    ED course  Patient arrives here with low normal blood pressure, otherwise normal vitals.  She appears well overall.  She has dried blood in the left nostril, no active bleeding, no source of bleeding identified.  Per chart review was here for same recently, new to dual anticoagulation.  In regards to her blood pressure, possible vasovagal reaction to her nosebleed versus acute blood loss anemia versus ACS versus seemingly less likely sepsis with her having no infectious symptoms prior to the nosebleed.  Will check labs, troponin, chest x-ray, urinalysis, give gentle fluid bolus.  This time, no bleeding identified, will observe.    Progress note: Labs reviewed, overall unrevealing. Her hemoglobin downtrending overall from prior but not requiring transfusion. She denies any symptoms of GI bleed. After small fluid bolus, patient feeling much better, no continued bleeding.  Chest x-ray is reviewed and negative.  Will check delta troponin, if stable likely discharge.  She already has ENT information for follow-up.    Progress note: repeat Troponin is stable.  Will discharge, ask her to hold her xarelto tonight.              Medications   sodium chloride 0.9 % IV bolus 500 mL (500 mL Intravenous New Bag 10/16/24 2336)                 Medical Decision Making      Disposition and Plan     Clinical Impression:  1. Epistaxis    2. Lightheadedness         Disposition:  Discharge  10/16/2024  6:53 pm    Follow-up:  [facility] Emergency Department  [address] [place] [facility] [address]  [phone]  Follow up  If symptoms worsen    We recommend that you schedule follow up care with a primary care provider within the next three months to obtain basic health screening including reassessment of your blood pressure.      Medications Prescribed:  Current Discharge Medication List              Supplementary Documentation:

## 2024-10-16 NOTE — DISCHARGE INSTRUCTIONS
Please follow-up with ENT if you continue to have nosebleeds.    Your dose of metoprolol and Xarelto this evening.  Please resume your medications tomorrow.  Return back to ER if you have continued bleeding, feel unwell, chest pain, shortness of breath, any other concerns

## 2024-10-16 NOTE — ED INITIAL ASSESSMENT (HPI)
Patient arrives ambulatory through triage with c/o nose bleed that started this morning at 1030. Patient states that she did feel weak, does state that she did feel pre syncopal this morning.

## 2024-10-22 ENCOUNTER — CARDPULM VISIT (OUTPATIENT)
Dept: CARDIAC REHAB | Facility: HOSPITAL | Age: 84
End: 2024-10-22
Attending: INTERNAL MEDICINE
Payer: MEDICARE

## 2024-10-22 PROCEDURE — 93798 PHYS/QHP OP CAR RHAB W/ECG: CPT

## 2024-10-24 ENCOUNTER — CARDPULM VISIT (OUTPATIENT)
Dept: CARDIAC REHAB | Facility: HOSPITAL | Age: 84
End: 2024-10-24
Attending: INTERNAL MEDICINE
Payer: MEDICARE

## 2024-10-24 PROCEDURE — 93798 PHYS/QHP OP CAR RHAB W/ECG: CPT

## 2024-10-29 ENCOUNTER — CARDPULM VISIT (OUTPATIENT)
Dept: CARDIAC REHAB | Facility: HOSPITAL | Age: 84
End: 2024-10-29
Attending: INTERNAL MEDICINE
Payer: MEDICARE

## 2024-10-29 PROCEDURE — 93798 PHYS/QHP OP CAR RHAB W/ECG: CPT

## 2024-11-01 ENCOUNTER — LAB ENCOUNTER (OUTPATIENT)
Dept: LAB | Facility: HOSPITAL | Age: 84
End: 2024-11-01
Attending: PHYSICIAN ASSISTANT
Payer: MEDICARE

## 2024-11-01 DIAGNOSIS — Z79.01 LONG TERM (CURRENT) USE OF ANTICOAGULANTS: Primary | ICD-10-CM

## 2024-11-01 DIAGNOSIS — R04.0 EPISTAXIS: ICD-10-CM

## 2024-11-01 LAB
ANION GAP SERPL CALC-SCNC: 8 MMOL/L (ref 0–18)
BASOPHILS # BLD AUTO: 0.05 X10(3) UL (ref 0–0.2)
BASOPHILS NFR BLD AUTO: 0.7 %
BUN BLD-MCNC: 14 MG/DL (ref 9–23)
BUN/CREAT SERPL: 15.4 (ref 10–20)
CALCIUM BLD-MCNC: 8.8 MG/DL (ref 8.7–10.4)
CHLORIDE SERPL-SCNC: 108 MMOL/L (ref 98–112)
CO2 SERPL-SCNC: 23 MMOL/L (ref 21–32)
CREAT BLD-MCNC: 0.91 MG/DL
DEPRECATED RDW RBC AUTO: 50.4 FL (ref 35.1–46.3)
EGFRCR SERPLBLD CKD-EPI 2021: 63 ML/MIN/1.73M2 (ref 60–?)
EOSINOPHIL # BLD AUTO: 0.09 X10(3) UL (ref 0–0.7)
EOSINOPHIL NFR BLD AUTO: 1.2 %
ERYTHROCYTE [DISTWIDTH] IN BLOOD BY AUTOMATED COUNT: 15 % (ref 11–15)
FASTING STATUS PATIENT QL REPORTED: NO
GLUCOSE BLD-MCNC: 110 MG/DL (ref 70–99)
HCT VFR BLD AUTO: 24.2 %
HGB BLD-MCNC: 7.8 G/DL
IMM GRANULOCYTES # BLD AUTO: 0.03 X10(3) UL (ref 0–1)
IMM GRANULOCYTES NFR BLD: 0.4 %
LYMPHOCYTES # BLD AUTO: 1.27 X10(3) UL (ref 1–4)
LYMPHOCYTES NFR BLD AUTO: 16.7 %
MCH RBC QN AUTO: 30.5 PG (ref 26–34)
MCHC RBC AUTO-ENTMCNC: 32.2 G/DL (ref 31–37)
MCV RBC AUTO: 94.5 FL
MONOCYTES # BLD AUTO: 0.72 X10(3) UL (ref 0.1–1)
MONOCYTES NFR BLD AUTO: 9.4 %
NEUTROPHILS # BLD AUTO: 5.46 X10 (3) UL (ref 1.5–7.7)
NEUTROPHILS # BLD AUTO: 5.46 X10(3) UL (ref 1.5–7.7)
NEUTROPHILS NFR BLD AUTO: 71.6 %
OSMOLALITY SERPL CALC.SUM OF ELEC: 289 MOSM/KG (ref 275–295)
PLATELET # BLD AUTO: 316 10(3)UL (ref 150–450)
POTASSIUM SERPL-SCNC: 4.2 MMOL/L (ref 3.5–5.1)
RBC # BLD AUTO: 2.56 X10(6)UL
SODIUM SERPL-SCNC: 139 MMOL/L (ref 136–145)
WBC # BLD AUTO: 7.6 X10(3) UL (ref 4–11)

## 2024-11-01 PROCEDURE — 80048 BASIC METABOLIC PNL TOTAL CA: CPT

## 2024-11-01 PROCEDURE — 85025 COMPLETE CBC W/AUTO DIFF WBC: CPT

## 2024-11-01 PROCEDURE — 36415 COLL VENOUS BLD VENIPUNCTURE: CPT

## 2024-11-04 ENCOUNTER — OFFICE VISIT (OUTPATIENT)
Dept: OTOLARYNGOLOGY | Facility: CLINIC | Age: 84
End: 2024-11-04
Payer: MEDICARE

## 2024-11-04 DIAGNOSIS — R04.0 LEFT-SIDED EPISTAXIS: Primary | ICD-10-CM

## 2024-11-04 PROCEDURE — 99203 OFFICE O/P NEW LOW 30 MIN: CPT | Performed by: OTOLARYNGOLOGY

## 2024-11-04 NOTE — PROGRESS NOTES
NEW PATIENT PROGRESS NOTE  OTOLOGY/OTOLARYNGOLOGY    REF MD:  No referring provider defined for this encounter.    PCP: Harpreet Pratt MD    CHIEF COMPLAINT:  No chief complaint on file.      HISTORY OF PRESENT ILLNESS: Jeannette Zhang is a 84 year old female who presents for evaluation of left-sided epistaxis. Patient has experienced nosebleeds and low blood pressure, leading her to visit the ED twice. Nosebleeds lasting up to 5.5 hrs. The second episode of epistaxis occurred on 10/16/2024 and was described as severe, with her hemoglobin level decreased to 10.8. The bleeding was stopping when she was examined, and no source for the bleeding was identified. She had one prior nosebleed, discussed at her last office visit, and denies any history of nosebleeds before this. She reports considerable fatigue and is concerned about low blood pressure. She denies any chest pain, pressure, or shortness of breath. She denies any history of nosebleeds except as a child. Xarelto was recently discontinued, and she started on Eliquis 5 mg twice a day. She is here for further evaluation. Other medical history: Afib. Recent MI w/ stent placement on 09/16/2024.     PAST MEDICAL HISTORY:    Past Medical History:    Essential hypertension    Heart attack (HCC)    Hyperlipidemia       PAST SURGICAL HISTORY:    Past Surgical History:   Procedure Laterality Date    Colonoscopy,biopsy  11/13/2012    Procedure: COLONOSCOPY, POSSIBLE BIOPSY, POSSIBLE POLYPECTOMY 82859;  Surgeon: Jan Snyder MD;  Location: [facility]    Patient documented not to have experienced any of the following events  11/13/2012    Procedure: ESOPHAGOGASTRODUODENOSCOPY, COLONOSCOPY, POSSIBLE BIOPSY, POSSIBLE POLYPECTOMY 98067,69146;  Surgeon: Jan Snyder MD;  Location: [facility]    Patient withough preoperative order for iv antibiotic surgical site infection prophylaxis.  11/13/2012    Procedure: ESOPHAGOGASTRODUODENOSCOPY, COLONOSCOPY,  POSSIBLE BIOPSY, POSSIBLE POLYPECTOMY 30160,27567;  Surgeon: Jan Snyder MD;  Location: [facility]       Medications Ordered Prior to Encounter[1]    Allergies: Allergies[2]    SOCIAL HISTORY:    Social History     Tobacco Use    Smoking status: Former     Current packs/day: 0.00     Average packs/day: 0.5 packs/day for 20.0 years (10.0 ttl pk-yrs)     Types: Cigarettes     Start date: 10/10/1987     Quit date: 10/10/2007     Years since quittin.0    Smokeless tobacco: Never   Substance Use Topics    Alcohol use: Not Currently       FAMILY HISTORY: Denies known family history of hearing loss, tinnitus, vertigo, or migraine.  Denies known family history of head and neck cancer, thyroid cancer, bleeding disorders.     REVIEW OF SYSTEMS:   Positives are in bold  Neuro: Headache, facial weakness, facial numbness, neck pain, vertigo  ENT: Hearing change, tinnitus, otorrhea, otalgia, aural fullness, ear pressure, vertigo, imbalance  Sinus pressure, rhinorrhea, epistaxis, congestion, facial pain, jaw pain, dysphagia, odynophagia, sore throat, voice changes, shortness of breath    EXAMINATION:  I washed my hands with an alcohol-based hand gel prior to examination  Constitutional:   --Vitals: not currently breastfeeding.  General: no apparent distress, well-developed, conversant  Psych: affect pleasant and appropriate for age, alert and oriented  Neuro: Cranial nerves: EOMI, Facial sensation intact to touch, palate elevates midline, tongue protrudes midline, shoulder shrug intact bilateral, facial movement normal bilateral  Respiratory: No stridor, stertor or increased work of breathing  ENT:  --Nose: Thin nasal dorsum with alar retraction bilaterally, anterior rhinoscopy: Septum midline, no inferior turbinate hypertrophy, mucosa healthy, no rhinorrhea  --Neck: No palpable cervical lymphadenopathy, no thyromegaly, no masses or lesions over the bilateral submandibular or parotid glands  --Ear: (bilateral  ears were examined under binocular microscopy)  Right ear microscopic exam:  Pinna: Normal, no lesions or masses.  Mastoid: Nontender on palpation.   External auditory canal: Clear, no masses or lesions.  Tympanic membrane: Intact, no lesions, normal landmarks.  Middle ear: Aerated.    Left ear microscopic exam:  Pinna: Normal, no lesions or masses.  Mastoid: Nontender on palpation.   External auditory canal: Clear, no masses or lesions.  Tympanic membrane: Intact, no lesions, normal landmarks.  Middle ear: Aerated.    Nasal endoscopy (date 11/4/2024)  Verbal consent obtained, patient was correctly identified  Topical anesthesia with aerosolized lidocaine and neosynepherine spray in the bilateral nares  Findings:   Right: No mucous throughout. Normal mucosa. Inferior turbinate is non-hypertrophic. Middle meatus is without polyps, purulence, masses. Middle turbinate is well formed and normal appearing. Sphenoethmoid recess is without polyps, purulence, masses.   Left: No mucous throughout. Normal mucosa. Hypervascular lesion on head of inferior turbinate (possible engorged blood vessel vs papilloma). Inferior turbinate is non-hypertrophic. Middle meatus is without polyps, purulence, masses. Middle turbinate is well formed and normal appearing. Sphenoethmoid recess is without polyps, purulence, masses.   Septum: largely midline  Nasopharynx: No masses, bilateral eustachian tubes are patent. The bilateral fossae of Rosenmueller are clear.     ASSESSMENT/PLAN:  Jeannette Zhang is a 84 year old female with   No diagnosis found.     IMPRESSION:  Patient is on Xarelto and Plavix for a recent stent placement on 09/16/2024 after MI and AFib.  Left epistaxis, not controlled with conservative measures  Possible left inferior turbinate vascular lesion    PLAN:  -Recommend possible biopsy/electrocautery of left inferior turbinate under anesthesia  -Will have to discuss with patient's Internal Medicine specialist and  cardiologist regarding clearance for surgery and safety of holding blood thinner and antiplatelet medications in light of recent stent and MI.  -Will send note to cardiologist and contact patient once I have further clarity    Situation reviewed with the patient in detail.    Attention: This note has been scribed by Abi Horton under the supervision of Chris Ruiz MD.     Chris Ruiz MD  Otology/Otolaryngology  [address] [place] [id number]  Phone [phone]  Fax [phone]      I have personally performed the services described in this documentation. All medical record entries made by the scribe were at my direction and in my presence. I have reviewed the chart and agree that the medical record reflects my personal performance and is accurate and complete.             [1]   Current Outpatient Medications on File Prior to Visit   Medication Sig Dispense Refill    amLODIPine 10 MG Oral Tab Take 1 tablet (10 mg total) by mouth daily. 30 tablet 1    metoprolol tartrate 50 MG Oral Tab Take 1 tablet (50 mg total) by mouth 2x Daily(Beta Blocker). 60 tablet 1    hydroCHLOROthiazide 12.5 MG Oral Tab Take 0.5 tablets (6.25 mg total) by mouth Daily Beta Blocker. 30 tablet 0    clopidogrel 75 MG Oral Tab Take 1 tablet (75 mg total) by mouth daily. 30 tablet 1    nitroglycerin 0.4 MG Sublingual SL Tab Place 1 tablet (0.4 mg total) under the tongue every 5 (five) minutes as needed for Chest pain. 30 tablet 0    pravastatin 20 MG Oral Tab Take 1 tablet (20 mg total) by mouth nightly. 30 tablet 1    rivaroxaban 20 MG Oral Tab Take 1 tablet (20 mg total) by mouth nightly. 30 tablet 1    Cholecalciferol 50 MCG (2000 UT) Oral Tab Take 1 tablet (2,000 Units total) by mouth daily.      Calcium Carb-Cholecalciferol 500-10 MG-MCG Oral Tab Take 1 tablet by mouth daily.      Multiple Vitamin (MULTIVITAMINS OR) Take 1 tablet by mouth daily.      LOSARTAN 100  MG Oral Tab TAKE ONE TABLET BY MOUTH ONCE DAILY 90 tablet 0     No current facility-administered medications on file prior to visit.   [2]   Allergies  Allergen Reactions    Allegra      Made heart race    Statins OTHER (SEE COMMENTS)     Stiff shoulder and hip pain    Statins-HMG-CoA Reductase inhibitors    Statins-HMG-CoA Reductase inhibitors      Stiff shoulder and hip pain

## 2024-11-05 ENCOUNTER — LAB ENCOUNTER (OUTPATIENT)
Dept: LAB | Facility: HOSPITAL | Age: 84
End: 2024-11-05
Attending: NURSE PRACTITIONER
Payer: MEDICARE

## 2024-11-05 DIAGNOSIS — E87.1 HYPONATREMIA: ICD-10-CM

## 2024-11-05 LAB
ANION GAP SERPL CALC-SCNC: 6 MMOL/L (ref 0–18)
BUN BLD-MCNC: 10 MG/DL (ref 9–23)
BUN/CREAT SERPL: 12 (ref 10–20)
CALCIUM BLD-MCNC: 9.1 MG/DL (ref 8.7–10.4)
CHLORIDE SERPL-SCNC: 109 MMOL/L (ref 98–112)
CO2 SERPL-SCNC: 24 MMOL/L (ref 21–32)
CREAT BLD-MCNC: 0.83 MG/DL
EGFRCR SERPLBLD CKD-EPI 2021: 70 ML/MIN/1.73M2 (ref 60–?)
FASTING STATUS PATIENT QL REPORTED: NO
GLUCOSE BLD-MCNC: 97 MG/DL (ref 70–99)
OSMOLALITY SERPL CALC.SUM OF ELEC: 287 MOSM/KG (ref 275–295)
POTASSIUM SERPL-SCNC: 4.4 MMOL/L (ref 3.5–5.1)
SODIUM SERPL-SCNC: 139 MMOL/L (ref 136–145)

## 2024-11-05 PROCEDURE — 36415 COLL VENOUS BLD VENIPUNCTURE: CPT

## 2024-11-05 PROCEDURE — 80048 BASIC METABOLIC PNL TOTAL CA: CPT

## 2024-11-06 ENCOUNTER — TELEPHONE (OUTPATIENT)
Dept: OTOLARYNGOLOGY | Facility: CLINIC | Age: 84
End: 2024-11-06

## 2024-11-06 NOTE — TELEPHONE ENCOUNTER
[facility] patient keeps calling them to ask about cardiac clearance for a surgery with ENT. [facility] is requesting a request is sent to them for patient to get cardiac clearance. Patient has appointment with them on 11/11. Please fax to [phone]. Please advise.

## 2024-11-07 ENCOUNTER — LAB ENCOUNTER (OUTPATIENT)
Dept: LAB | Facility: HOSPITAL | Age: 84
End: 2024-11-07
Attending: PHYSICIAN ASSISTANT
Payer: MEDICARE

## 2024-11-07 ENCOUNTER — CARDPULM VISIT (OUTPATIENT)
Dept: CARDIAC REHAB | Facility: HOSPITAL | Age: 84
End: 2024-11-07
Attending: INTERNAL MEDICINE
Payer: MEDICARE

## 2024-11-07 DIAGNOSIS — R04.0 EPISTAXIS: Primary | ICD-10-CM

## 2024-11-07 LAB
BASOPHILS # BLD AUTO: 0.06 X10(3) UL (ref 0–0.2)
BASOPHILS NFR BLD AUTO: 1 %
DEPRECATED RDW RBC AUTO: 50.9 FL (ref 35.1–46.3)
EOSINOPHIL # BLD AUTO: 0.12 X10(3) UL (ref 0–0.7)
EOSINOPHIL NFR BLD AUTO: 2 %
ERYTHROCYTE [DISTWIDTH] IN BLOOD BY AUTOMATED COUNT: 14.8 % (ref 11–15)
HCT VFR BLD AUTO: 26.3 %
HGB BLD-MCNC: 8.1 G/DL
IMM GRANULOCYTES # BLD AUTO: 0.03 X10(3) UL (ref 0–1)
IMM GRANULOCYTES NFR BLD: 0.5 %
LYMPHOCYTES # BLD AUTO: 1.05 X10(3) UL (ref 1–4)
LYMPHOCYTES NFR BLD AUTO: 17.9 %
MCH RBC QN AUTO: 28.9 PG (ref 26–34)
MCHC RBC AUTO-ENTMCNC: 30.8 G/DL (ref 31–37)
MCV RBC AUTO: 93.9 FL
MONOCYTES # BLD AUTO: 0.66 X10(3) UL (ref 0.1–1)
MONOCYTES NFR BLD AUTO: 11.2 %
NEUTROPHILS # BLD AUTO: 3.96 X10 (3) UL (ref 1.5–7.7)
NEUTROPHILS # BLD AUTO: 3.96 X10(3) UL (ref 1.5–7.7)
NEUTROPHILS NFR BLD AUTO: 67.4 %
PLATELET # BLD AUTO: 328 10(3)UL (ref 150–450)
RBC # BLD AUTO: 2.8 X10(6)UL
WBC # BLD AUTO: 5.9 X10(3) UL (ref 4–11)

## 2024-11-07 PROCEDURE — 93798 PHYS/QHP OP CAR RHAB W/ECG: CPT

## 2024-11-07 PROCEDURE — 36415 COLL VENOUS BLD VENIPUNCTURE: CPT

## 2024-11-07 PROCEDURE — 85025 COMPLETE CBC W/AUTO DIFF WBC: CPT

## 2024-11-07 NOTE — TELEPHONE ENCOUNTER
Dr. Ruiz, we received a call from the [facility], they are requesting a letter for Cardiac Clearance, they are seeing Jeannette on 11/11/24.  Your note on 11/4/24 mentioned sending a note to the cardiologist and then contacting patient for further clarity. Please advise.

## 2024-11-07 NOTE — TELEPHONE ENCOUNTER
Attempted to call the [facility], left a voicemail letting them know that I would be faxing over Dr. Ruiz's office notes and we need to know if Jeannette can go off of blood thinners safely prior to nasal cautery and biopsy in the OR and if it's safe for general anesthesia.     Fax Confirmation received.

## 2024-11-14 ENCOUNTER — CARDPULM VISIT (OUTPATIENT)
Dept: CARDIAC REHAB | Facility: HOSPITAL | Age: 84
End: 2024-11-14
Attending: INTERNAL MEDICINE
Payer: MEDICARE

## 2024-11-14 PROCEDURE — 93798 PHYS/QHP OP CAR RHAB W/ECG: CPT

## 2024-11-15 DIAGNOSIS — J34.89 NASAL LESION: ICD-10-CM

## 2024-11-15 DIAGNOSIS — R04.0 LEFT-SIDED EPISTAXIS: Primary | ICD-10-CM

## 2024-11-15 NOTE — PROGRESS NOTES
Patient scheduled for Left nasal endoscopy, biopsy, and electrocautery on 11/19/24 at [facility].

## 2024-11-19 PROBLEM — R04.0 LEFT-SIDED EPISTAXIS: Status: ACTIVE | Noted: 2024-11-19

## 2024-11-19 PROBLEM — D49.1: Status: ACTIVE | Noted: 2024-11-19

## 2024-11-19 PROCEDURE — 88305 TISSUE EXAM BY PATHOLOGIST: CPT | Performed by: OTOLARYNGOLOGY

## 2024-11-20 ENCOUNTER — TELEPHONE (OUTPATIENT)
Dept: OTOLARYNGOLOGY | Facility: CLINIC | Age: 84
End: 2024-11-20

## 2024-11-20 NOTE — TELEPHONE ENCOUNTER
Per  pt can resume cardiac rehab after her first follow up on 12/03. Letter generated and pt informed.

## 2024-11-20 NOTE — TELEPHONE ENCOUNTER
Pt post op day 1  NASAL ENDOSCOPY, BIOPSY, AND ELECTROCAUTERY; Please make a follow-up appointment 1-2 weeks after surgery with Dr. Joseph Ruiz. 2. Avoid heavy lifting, straining. Do not blow your nose.Sneeze with your mouth open. Sleep with your head slightly elevated. No deep forward bending. You will be allowed to drop these restrictions at your followup. 3. Avoid putting anything in your nose. Do not use any nasal sprays in the nose. Avoid fingers or other manipulation in the nose. Use a humidifier. 4. You can resume your blood thinner 48 hours after surgery. Please check with your cardiologist to ensure that they would also like you to restart your blood thinner. Continue your plavix. 5. It is ok to resume any usual medications unless otherwise directed by your doctor. 6. If there are any issues with urination or bowel movements after surgery, please let your doctor know.

## 2024-11-21 ENCOUNTER — APPOINTMENT (OUTPATIENT)
Dept: CARDIAC REHAB | Facility: HOSPITAL | Age: 84
End: 2024-11-21
Attending: INTERNAL MEDICINE
Payer: MEDICARE

## 2024-11-22 ENCOUNTER — TELEPHONE (OUTPATIENT)
Dept: OTOLARYNGOLOGY | Facility: CLINIC | Age: 84
End: 2024-11-22

## 2024-11-22 NOTE — TELEPHONE ENCOUNTER
Per patient she developed constipation and is requesting a stool softener to be sent [facility] in [place]. Per patient also asking what foods she can eat to help. Please advise

## 2024-11-26 ENCOUNTER — APPOINTMENT (OUTPATIENT)
Dept: CARDIAC REHAB | Facility: HOSPITAL | Age: 84
End: 2024-11-26
Attending: INTERNAL MEDICINE
Payer: MEDICARE

## 2024-12-02 ENCOUNTER — OFFICE VISIT (OUTPATIENT)
Dept: OTOLARYNGOLOGY | Facility: CLINIC | Age: 84
End: 2024-12-02

## 2024-12-02 DIAGNOSIS — R04.0 LEFT-SIDED EPISTAXIS: Primary | ICD-10-CM

## 2024-12-02 PROCEDURE — 99024 POSTOP FOLLOW-UP VISIT: CPT | Performed by: OTOLARYNGOLOGY

## 2024-12-02 NOTE — PROGRESS NOTES
PATIENT PROGRESS NOTE  OTOLOGY/OTOLARYNGOLOGY    REF MD:  No referring provider defined for this encounter.    PCP: Harpreet Pratt MD    CHIEF COMPLAINT:  No chief complaint on file.    LAST VISIT 11/04/2024  IMPRESSION:  Patient is on Xarelto and Plavix for a recent stent placement on 09/16/2024 after MI and AFib.  Left epistaxis, not controlled with conservative measures  Possible left inferior turbinate vascular lesion    PLAN:  -Recommend possible biopsy/electrocautery of left inferior turbinate under anesthesia  -Will have to discuss with patient's Internal Medicine specialist and cardiologist regarding clearance for surgery and safety of holding blood thinner and antiplatelet medications in light of recent stent and MI.  -Will send note to cardiologist and contact patient once I have further clarity  __________________________________________________________________________________  Interval history: S/P Nasal endoscopy, left nasal biopsy and electrocautery - 11/19/2024.   Patient is currently on Eliquis and the antiplatelet medication Plavix. There has been no bleeding since the procedure.    HISTORY OF PRESENT ILLNESS: Jeannette Zhang is a 84 year old female who presents for evaluation of left-sided epistaxis. Patient has experienced nosebleeds and low blood pressure, leading her to visit the ED twice. Nosebleeds lasting up to 5.5 hrs. The second episode of epistaxis occurred on 10/16/2024 and was described as severe, with her hemoglobin level decreased to 10.8. The bleeding was stopping when she was examined, and no source for the bleeding was identified. She had one prior nosebleed, discussed at her last office visit, and denies any history of nosebleeds before this. She reports considerable fatigue and is concerned about low blood pressure. She denies any chest pain, pressure, or shortness of breath. She denies any history of nosebleeds except as a child. Xarelto was recently discontinued, and she  started on Eliquis 5 mg twice a day. She is here for further evaluation. Other medical history: Afib. Recent MI w/ stent placement on 2024.     PAST MEDICAL HISTORY:    Past Medical History:    Arrhythmia    Essential hypertension    Heart attack (HCC)    High blood pressure    High cholesterol    Hyperlipidemia       PAST SURGICAL HISTORY:    Past Surgical History:   Procedure Laterality Date    Colonoscopy,biopsy  2012    Procedure: COLONOSCOPY, POSSIBLE BIOPSY, POSSIBLE POLYPECTOMY 55236;  Surgeon: Jan Snyder MD;  Location: [facility]    Patient documented not to have experienced any of the following events  2012    Procedure: ESOPHAGOGASTRODUODENOSCOPY, COLONOSCOPY, POSSIBLE BIOPSY, POSSIBLE POLYPECTOMY 13811,56231;  Surgeon: Jan Snyder MD;  Location: [facility]    Patient withough preoperative order for iv antibiotic surgical site infection prophylaxis.  2012    Procedure: ESOPHAGOGASTRODUODENOSCOPY, COLONOSCOPY, POSSIBLE BIOPSY, POSSIBLE POLYPECTOMY 68371,54328;  Surgeon: Jan Snyder MD;  Location: [facility]       Medications Ordered Prior to Encounter[1]    Allergies: Allergies[2]    SOCIAL HISTORY:    Social History     Tobacco Use    Smoking status: Former     Current packs/day: 0.00     Average packs/day: 0.5 packs/day for 20.0 years (10.0 ttl pk-yrs)     Types: Cigarettes     Start date: 10/10/1987     Quit date: 10/10/2007     Years since quittin.1    Smokeless tobacco: Never   Substance Use Topics    Alcohol use: Not Currently       FAMILY HISTORY: Denies known family history of hearing loss, tinnitus, vertigo, or migraine.  Denies known family history of head and neck cancer, thyroid cancer, bleeding disorders.     REVIEW OF SYSTEMS:   Positives are in bold  Neuro: Headache, facial weakness, facial numbness, neck pain, vertigo  ENT: Hearing change, tinnitus, otorrhea, otalgia, aural fullness, ear pressure, vertigo,  imbalance  Sinus pressure, rhinorrhea, epistaxis, congestion, facial pain, jaw pain, dysphagia, odynophagia, sore throat, voice changes, shortness of breath    EXAMINATION:  I washed my hands with an alcohol-based hand gel prior to examination  Constitutional:   --Vitals: not currently breastfeeding.  General: no apparent distress, well-developed, conversant  Psych: affect pleasant and appropriate for age, alert and oriented  Neuro: Cranial nerves: EOMI, Facial sensation intact to touch, palate elevates midline, tongue protrudes midline, shoulder shrug intact bilateral, facial movement normal bilateral  Respiratory: No stridor, stertor or increased work of breathing  ENT:  --Nose: Thin nasal dorsum with alar retraction bilaterally, anterior rhinoscopy: Septum midline, no inferior turbinate hypertrophy, mucosa healthy, no rhinorrhea  --Neck: No palpable cervical lymphadenopathy, no thyromegaly, no masses or lesions over the bilateral submandibular or parotid glands  --Ear: (bilateral ears were examined under binocular microscopy)  Right ear microscopic exam:  Pinna: Normal, no lesions or masses.  Mastoid: Nontender on palpation.   External auditory canal: Clear, no masses or lesions.  Tympanic membrane: Intact, no lesions, normal landmarks.  Middle ear: Aerated.    Left ear microscopic exam:  Pinna: Normal, no lesions or masses.  Mastoid: Nontender on palpation.   External auditory canal: Clear, no masses or lesions.  Tympanic membrane: Intact, no lesions, normal landmarks.  Middle ear: Aerated.    Nasal endoscopy (date 11/4/2024)  Verbal consent obtained, patient was correctly identified  Topical anesthesia with aerosolized lidocaine and neosynepherine spray in the bilateral nares  Findings:   Left: No mucous throughout. Normal mucosa. Scabbing adherent head of left inferior turbinate.  Inferior turbinate is non-hypertrophic. Middle meatus is without polyps, purulence, masses. Middle turbinate is well formed and  normal appearing. Sphenoethmoid recess is without polyps, purulence, masses.   Septum: largely midline    Pathology Tissue 11/19/2024  Final Diagnosis:      Left inferior turbinate mass; biopsy:   Polypoid fragment of squamous epithelium and subepithelium with mild acute inflammation and subepithelial granulation tissue formation with benign vascular proliferation, consistent with pyogenic granuloma.  No evidence of dysplasia or malignancy is identified.     Interpretation   Benign       ASSESSMENT/PLAN:  Jeannette Zhang is a 84 year old female with   No diagnosis found.     IMPRESSION:  Patient is on Xarelto and Plavix for a recent stent placement on 09/16/2024 after MI and AFib.  Left epistaxis, left inferior turbinate pyogenic granuloma - S/P Nasal endoscopy, left nasal biopsy and electrocautery - 11/19/2024.       PLAN:  - Resume cardiac rehab, continue taking Eliquis and Plavix.  - Epistaxis precautions were discussed with patient:  - Continue avoiding nose blowing for one week  - Cough/sneeze with your mouth open  - Purchase afrin nasal spray to use only at the time of epistaxis; instructed patient on proper nasal pressure application for episodes in the future  - Recommend increasing nasal moisture - humidifier, can restart nasal saline sprays PRN  - Follow-up in 2 weeks    Situation reviewed with the patient in detail.    Attention: This note has been scribed by Abi Horton under the supervision of Chris Ruiz MD.     Chris Ruiz MD  Otology/Otolaryngology  [address] [place] [id number]  Phone [phone]  Fax [phone]      I have personally performed the services described in this documentation. All medical record entries made by the scribe were at my direction and in my presence. I have reviewed the chart and agree that the medical record reflects my personal performance and is accurate and complete.             [1]    Current Outpatient Medications on File Prior to Visit   Medication Sig Dispense Refill    rivaroxaban 20 MG Oral Tab Take 1 tablet (20 mg total) by mouth nightly.      apixaban (ELIQUIS) 5 MG Oral Tab Take 5 mg by mouth 2 (two) times daily.      amLODIPine 10 MG Oral Tab Take 1 tablet (10 mg total) by mouth daily. 30 tablet 1    metoprolol tartrate 50 MG Oral Tab Take 1 tablet (50 mg total) by mouth 2x Daily(Beta Blocker). (Patient taking differently: Take 25 mg by mouth 2x Daily(Beta Blocker).) 60 tablet 1    hydroCHLOROthiazide 12.5 MG Oral Tab Take 0.5 tablets (6.25 mg total) by mouth Daily Beta Blocker. 30 tablet 0    clopidogrel 75 MG Oral Tab Take 1 tablet (75 mg total) by mouth daily. 30 tablet 1    nitroglycerin 0.4 MG Sublingual SL Tab Place 1 tablet (0.4 mg total) under the tongue every 5 (five) minutes as needed for Chest pain. 30 tablet 0    pravastatin 20 MG Oral Tab Take 1 tablet (20 mg total) by mouth nightly. 30 tablet 1    Cholecalciferol 50 MCG (2000 UT) Oral Tab Take 1 tablet (2,000 Units total) by mouth daily.      Calcium Carb-Cholecalciferol 500-10 MG-MCG Oral Tab Take 1 tablet by mouth daily.      Multiple Vitamin (MULTIVITAMINS OR) Take 1 tablet by mouth daily.      LOSARTAN 100 MG Oral Tab TAKE ONE TABLET BY MOUTH ONCE DAILY 90 tablet 0     No current facility-administered medications on file prior to visit.   [2]   Allergies  Allergen Reactions    Allegra      Made heart race    Statins OTHER (SEE COMMENTS)     Stiff shoulder and hip pain    Statins-HMG-CoA Reductase inhibitors    Statins-HMG-CoA Reductase inhibitors      Stiff shoulder and hip pain

## 2024-12-10 ENCOUNTER — CARDPULM VISIT (OUTPATIENT)
Dept: CARDIAC REHAB | Facility: HOSPITAL | Age: 84
End: 2024-12-10
Attending: INTERNAL MEDICINE
Payer: MEDICARE

## 2024-12-10 PROCEDURE — 93798 PHYS/QHP OP CAR RHAB W/ECG: CPT

## 2024-12-12 ENCOUNTER — CARDPULM VISIT (OUTPATIENT)
Dept: CARDIAC REHAB | Facility: HOSPITAL | Age: 84
End: 2024-12-12
Attending: INTERNAL MEDICINE
Payer: MEDICARE

## 2024-12-12 PROCEDURE — 93798 PHYS/QHP OP CAR RHAB W/ECG: CPT

## 2024-12-16 ENCOUNTER — OFFICE VISIT (OUTPATIENT)
Dept: OTOLARYNGOLOGY | Facility: CLINIC | Age: 84
End: 2024-12-16

## 2024-12-16 VITALS — BODY MASS INDEX: 29.99 KG/M2 | HEIGHT: 65 IN | WEIGHT: 180 LBS

## 2024-12-16 DIAGNOSIS — R04.0 LEFT-SIDED EPISTAXIS: Primary | ICD-10-CM

## 2024-12-16 PROCEDURE — 99213 OFFICE O/P EST LOW 20 MIN: CPT | Performed by: OTOLARYNGOLOGY

## 2024-12-16 RX ORDER — KETOCONAZOLE 20 MG/ML
SHAMPOO, SUSPENSION TOPICAL
COMMUNITY
Start: 2024-11-27

## 2024-12-16 RX ORDER — CLOBETASOL PROPIONATE 0.5 MG/G
OINTMENT TOPICAL
COMMUNITY
Start: 2024-10-14

## 2024-12-16 NOTE — PROGRESS NOTES
PATIENT PROGRESS NOTE  OTOLOGY/OTOLARYNGOLOGY    REF MD:  No referring provider defined for this encounter.    PCP: Harpreet Pratt MD    CHIEF COMPLAINT:    Chief Complaint   Patient presents with    Post-Op     Patient here for nasal post op, left side.     LAST VISIT 11/04/2024  IMPRESSION:  Patient is on Xarelto and Plavix for a recent stent placement on 09/16/2024 after MI and AFib.  Left epistaxis, not controlled with conservative measures  Possible left inferior turbinate vascular lesion    PLAN:  -Recommend possible biopsy/electrocautery of left inferior turbinate under anesthesia  -Will have to discuss with patient's Internal Medicine specialist and cardiologist regarding clearance for surgery and safety of holding blood thinner and antiplatelet medications in light of recent stent and MI.  -Will send note to cardiologist and contact patient once I have further clarity  __________________________________________________________________________________  Interval history: S/P Nasal endoscopy, left nasal biopsy and electrocautery - 11/19/2024.    Follow up post epistaxis management. No bleeding since procedure. On blood thinners and antiplatelet medications    HISTORY OF PRESENT ILLNESS: Jeannette Zhang is a 84 year old female who presents for evaluation of left-sided epistaxis. Patient has experienced nosebleeds and low blood pressure, leading her to visit the ED twice. Nosebleeds lasting up to 5.5 hrs. The second episode of epistaxis occurred on 10/16/2024 and was described as severe, with her hemoglobin level decreased to 10.8. The bleeding was stopping when she was examined, and no source for the bleeding was identified. She had one prior nosebleed, discussed at her last office visit, and denies any history of nosebleeds before this. She reports considerable fatigue and is concerned about low blood pressure. She denies any chest pain, pressure, or shortness of breath. She denies any history of nosebleeds  except as a child. Xarelto was recently discontinued, and she started on Eliquis 5 mg twice a day. She is here for further evaluation. Other medical history: Afib. Recent MI w/ stent placement on 2024.     PAST MEDICAL HISTORY:    Past Medical History:    Arrhythmia    Essential hypertension    Heart attack (HCC)    High blood pressure    High cholesterol    Hyperlipidemia       PAST SURGICAL HISTORY:    Past Surgical History:   Procedure Laterality Date    Colonoscopy,biopsy  2012    Procedure: COLONOSCOPY, POSSIBLE BIOPSY, POSSIBLE POLYPECTOMY 40187;  Surgeon: Jan Snyder MD;  Location: [facility]    Patient documented not to have experienced any of the following events  2012    Procedure: ESOPHAGOGASTRODUODENOSCOPY, COLONOSCOPY, POSSIBLE BIOPSY, POSSIBLE POLYPECTOMY 42569,67411;  Surgeon: Jan Snyder MD;  Location: [facility]    Patient withough preoperative order for iv antibiotic surgical site infection prophylaxis.  2012    Procedure: ESOPHAGOGASTRODUODENOSCOPY, COLONOSCOPY, POSSIBLE BIOPSY, POSSIBLE POLYPECTOMY 39114,45520;  Surgeon: Jan Snyder MD;  Location: [facility]       Medications Ordered Prior to Encounter[1]    Allergies: Allergies[2]    SOCIAL HISTORY:    Social History     Tobacco Use    Smoking status: Former     Current packs/day: 0.00     Average packs/day: 0.5 packs/day for 20.0 years (10.0 ttl pk-yrs)     Types: Cigarettes     Start date: 10/10/1987     Quit date: 10/10/2007     Years since quittin.1    Smokeless tobacco: Never   Substance Use Topics    Alcohol use: Not Currently       FAMILY HISTORY: Denies known family history of hearing loss, tinnitus, vertigo, or migraine.  Denies known family history of head and neck cancer, thyroid cancer, bleeding disorders.     REVIEW OF SYSTEMS:   Positives are in bold  Neuro: Headache, facial weakness, facial numbness, neck pain, vertigo  ENT: Hearing change, tinnitus,  otorrhea, otalgia, aural fullness, ear pressure, vertigo, imbalance  Sinus pressure, rhinorrhea, epistaxis, congestion, facial pain, jaw pain, dysphagia, odynophagia, sore throat, voice changes, shortness of breath    EXAMINATION:  I washed my hands with an alcohol-based hand gel prior to examination  Constitutional:   --Vitals: Height 5' 5\" (1.651 m), weight 180 lb, not currently breastfeeding.  General: no apparent distress, well-developed, conversant  Psych: affect pleasant and appropriate for age, alert and oriented  Respiratory: No stridor, stertor or increased work of breathing  ENT:  --Nose: Thin nasal dorsum with alar retraction bilaterally, anterior rhinoscopy: Septum midline, no inferior turbinate hypertrophy, mucosa healthy, no rhinorrhea    Nasal endoscopy (date 12/16/2024)  Verbal consent obtained, patient was correctly identified  Topical anesthesia with aerosolized lidocaine and neosynepherine spray in the bilateral nares  Findings:   Left: No mucous throughout. Normal mucosa. inferior turbinate is well healed.  Inferior turbinate is non-hypertrophic. Middle meatus is without polyps, purulence, masses. Middle turbinate is well formed and normal appearing. Sphenoethmoid recess is without polyps, purulence, masses.   Septum: largely midline    Pathology Tissue 11/19/2024  Final Diagnosis:      Left inferior turbinate mass; biopsy:   Polypoid fragment of squamous epithelium and subepithelium with mild acute inflammation and subepithelial granulation tissue formation with benign vascular proliferation, consistent with pyogenic granuloma.  No evidence of dysplasia or malignancy is identified.     Interpretation   Benign       ASSESSMENT/PLAN:  Jeannette Zhang is a 84 year old female with   No diagnosis found.     IMPRESSION:  Patient is on Xarelto and Plavix for a recent stent placement on 09/16/2024 after MI and AFib.  Left epistaxis, left inferior turbinate pyogenic granuloma - S/P Nasal endoscopy,  left nasal biopsy and electrocautery - 11/19/2024 -  Complete healing noted on nasal endoscopy      PLAN:  - Continue blood thinners and antiplatelet medication per cardiology  - Patient is aware of epistaxis management of minor episodes if needed  - No further restrictions on nose blowing, coughing, or sneezing  - Follow-up as needed    Situation reviewed with the patient in detail.    Attention: This note has been scribed by Abi Horton under the supervision of Chris Ruiz MD.     Chris Ruiz MD  Otology/Otolaryngology  [facility]   [address]  [place] [address]  Phone [phone]  Fax [phone]      I have personally performed the services described in this documentation. All medical record entries made by the scribe were at my direction and in my presence. I have reviewed the chart and agree that the medical record reflects my personal performance and is accurate and complete.             [1]   Current Outpatient Medications on File Prior to Visit   Medication Sig Dispense Refill    clobetasol 0.05 % External Ointment APPLY A THIN LAYER TOPICALLY TO AFFECTED AREA TWICE DAILY FOR UP TO 12 WEEKS THEN TWICE WEEKLY      ketoconazole 2 % External Shampoo APPLY ONCE, LATHER, AND RINSE AFTER 5 MINUTES. REPEAT IN 1 WEEK IF NEEDED      rivaroxaban 20 MG Oral Tab Take 1 tablet (20 mg total) by mouth nightly.      apixaban (ELIQUIS) 5 MG Oral Tab Take 1 tablet (5 mg total) by mouth 2 (two) times daily.      amLODIPine 10 MG Oral Tab Take 1 tablet (10 mg total) by mouth daily. 30 tablet 1    metoprolol tartrate 50 MG Oral Tab Take 1 tablet (50 mg total) by mouth 2x Daily(Beta Blocker). (Patient taking differently: Take 0.5 tablets (25 mg total) by mouth 2x Daily(Beta Blocker).) 60 tablet 1    clopidogrel 75 MG Oral Tab Take 1 tablet (75 mg total) by mouth daily. 30 tablet 1    nitroglycerin 0.4 MG Sublingual SL Tab Place 1 tablet (0.4 mg total) under the  tongue every 5 (five) minutes as needed for Chest pain. 30 tablet 0    pravastatin 20 MG Oral Tab Take 1 tablet (20 mg total) by mouth nightly. 30 tablet 1    Cholecalciferol 50 MCG (2000 UT) Oral Tab Take 1 tablet (2,000 Units total) by mouth daily.      Calcium Carb-Cholecalciferol 500-10 MG-MCG Oral Tab Take 1 tablet by mouth daily.      Multiple Vitamin (MULTIVITAMINS OR) Take 1 tablet by mouth daily.      LOSARTAN 100 MG Oral Tab TAKE ONE TABLET BY MOUTH ONCE DAILY 90 tablet 0    hydroCHLOROthiazide 12.5 MG Oral Tab Take 0.5 tablets (6.25 mg total) by mouth Daily Beta Blocker. (Patient not taking: Reported on 12/16/2024) 30 tablet 0     No current facility-administered medications on file prior to visit.   [2]   Allergies  Allergen Reactions    Allegra      Made heart race    Statins OTHER (SEE COMMENTS)     Stiff shoulder and hip pain    Statins-HMG-CoA Reductase inhibitors    Statins-HMG-CoA Reductase inhibitors      Stiff shoulder and hip pain

## 2024-12-17 ENCOUNTER — CARDPULM VISIT (OUTPATIENT)
Dept: CARDIAC REHAB | Facility: HOSPITAL | Age: 84
End: 2024-12-17
Attending: INTERNAL MEDICINE
Payer: MEDICARE

## 2024-12-17 PROCEDURE — 93798 PHYS/QHP OP CAR RHAB W/ECG: CPT

## 2025-01-02 ENCOUNTER — APPOINTMENT (OUTPATIENT)
Dept: CARDIAC REHAB | Facility: HOSPITAL | Age: 85
End: 2025-01-02
Attending: INTERNAL MEDICINE
Payer: MEDICARE

## 2025-01-07 ENCOUNTER — APPOINTMENT (OUTPATIENT)
Dept: CARDIAC REHAB | Facility: HOSPITAL | Age: 85
End: 2025-01-07
Attending: INTERNAL MEDICINE
Payer: MEDICARE

## 2025-01-08 ENCOUNTER — CARDPULM VISIT (OUTPATIENT)
Dept: CARDIAC REHAB | Facility: HOSPITAL | Age: 85
End: 2025-01-08
Attending: INTERNAL MEDICINE
Payer: MEDICARE

## 2025-01-08 PROCEDURE — 93798 PHYS/QHP OP CAR RHAB W/ECG: CPT

## 2025-01-09 ENCOUNTER — APPOINTMENT (OUTPATIENT)
Dept: CARDIAC REHAB | Facility: HOSPITAL | Age: 85
End: 2025-01-09
Attending: INTERNAL MEDICINE
Payer: MEDICARE

## 2025-01-10 ENCOUNTER — CARDPULM VISIT (OUTPATIENT)
Dept: CARDIAC REHAB | Facility: HOSPITAL | Age: 85
End: 2025-01-10
Attending: INTERNAL MEDICINE
Payer: MEDICARE

## 2025-01-10 PROCEDURE — 93798 PHYS/QHP OP CAR RHAB W/ECG: CPT

## 2025-01-13 ENCOUNTER — CARDPULM VISIT (OUTPATIENT)
Dept: CARDIAC REHAB | Facility: HOSPITAL | Age: 85
End: 2025-01-13
Attending: INTERNAL MEDICINE
Payer: MEDICARE

## 2025-01-13 PROCEDURE — 93798 PHYS/QHP OP CAR RHAB W/ECG: CPT

## 2025-01-14 ENCOUNTER — APPOINTMENT (OUTPATIENT)
Dept: CARDIAC REHAB | Facility: HOSPITAL | Age: 85
End: 2025-01-14
Attending: INTERNAL MEDICINE
Payer: MEDICARE

## 2025-01-15 ENCOUNTER — CARDPULM VISIT (OUTPATIENT)
Dept: CARDIAC REHAB | Facility: HOSPITAL | Age: 85
End: 2025-01-15
Attending: INTERNAL MEDICINE
Payer: MEDICARE

## 2025-01-15 PROCEDURE — 93798 PHYS/QHP OP CAR RHAB W/ECG: CPT

## 2025-01-16 ENCOUNTER — APPOINTMENT (OUTPATIENT)
Dept: CARDIAC REHAB | Facility: HOSPITAL | Age: 85
End: 2025-01-16
Attending: INTERNAL MEDICINE
Payer: MEDICARE

## 2025-01-17 ENCOUNTER — CARDPULM VISIT (OUTPATIENT)
Dept: CARDIAC REHAB | Facility: HOSPITAL | Age: 85
End: 2025-01-17
Attending: INTERNAL MEDICINE
Payer: MEDICARE

## 2025-01-17 PROCEDURE — 93798 PHYS/QHP OP CAR RHAB W/ECG: CPT

## 2025-01-21 ENCOUNTER — APPOINTMENT (OUTPATIENT)
Dept: CARDIAC REHAB | Facility: HOSPITAL | Age: 85
End: 2025-01-21
Attending: INTERNAL MEDICINE
Payer: MEDICARE

## 2025-01-22 ENCOUNTER — CARDPULM VISIT (OUTPATIENT)
Dept: CARDIAC REHAB | Facility: HOSPITAL | Age: 85
End: 2025-01-22
Attending: INTERNAL MEDICINE
Payer: MEDICARE

## 2025-01-22 PROCEDURE — 93798 PHYS/QHP OP CAR RHAB W/ECG: CPT

## 2025-01-23 ENCOUNTER — APPOINTMENT (OUTPATIENT)
Dept: CARDIAC REHAB | Facility: HOSPITAL | Age: 85
End: 2025-01-23
Attending: INTERNAL MEDICINE
Payer: MEDICARE

## 2025-01-24 ENCOUNTER — CARDPULM VISIT (OUTPATIENT)
Dept: CARDIAC REHAB | Facility: HOSPITAL | Age: 85
End: 2025-01-24
Attending: INTERNAL MEDICINE
Payer: MEDICARE

## 2025-01-24 PROCEDURE — 93798 PHYS/QHP OP CAR RHAB W/ECG: CPT

## 2025-01-27 ENCOUNTER — CARDPULM VISIT (OUTPATIENT)
Dept: CARDIAC REHAB | Facility: HOSPITAL | Age: 85
End: 2025-01-27
Attending: INTERNAL MEDICINE
Payer: MEDICARE

## 2025-01-27 PROCEDURE — 93798 PHYS/QHP OP CAR RHAB W/ECG: CPT

## 2025-01-28 ENCOUNTER — APPOINTMENT (OUTPATIENT)
Dept: CARDIAC REHAB | Facility: HOSPITAL | Age: 85
End: 2025-01-28
Attending: INTERNAL MEDICINE
Payer: MEDICARE

## 2025-01-29 ENCOUNTER — CARDPULM VISIT (OUTPATIENT)
Dept: CARDIAC REHAB | Facility: HOSPITAL | Age: 85
End: 2025-01-29
Attending: INTERNAL MEDICINE
Payer: MEDICARE

## 2025-01-29 PROCEDURE — 93798 PHYS/QHP OP CAR RHAB W/ECG: CPT

## 2025-01-30 ENCOUNTER — APPOINTMENT (OUTPATIENT)
Dept: CARDIAC REHAB | Facility: HOSPITAL | Age: 85
End: 2025-01-30
Attending: INTERNAL MEDICINE
Payer: MEDICARE

## 2025-02-03 ENCOUNTER — CARDPULM VISIT (OUTPATIENT)
Dept: CARDIAC REHAB | Facility: HOSPITAL | Age: 85
End: 2025-02-03
Attending: INTERNAL MEDICINE
Payer: MEDICARE

## 2025-02-03 PROCEDURE — 93798 PHYS/QHP OP CAR RHAB W/ECG: CPT

## 2025-02-04 ENCOUNTER — APPOINTMENT (OUTPATIENT)
Dept: CARDIAC REHAB | Facility: HOSPITAL | Age: 85
End: 2025-02-04
Attending: INTERNAL MEDICINE
Payer: MEDICARE

## 2025-02-05 ENCOUNTER — CARDPULM VISIT (OUTPATIENT)
Dept: CARDIAC REHAB | Facility: HOSPITAL | Age: 85
End: 2025-02-05
Attending: INTERNAL MEDICINE
Payer: MEDICARE

## 2025-02-05 PROCEDURE — 93798 PHYS/QHP OP CAR RHAB W/ECG: CPT

## 2025-02-06 ENCOUNTER — APPOINTMENT (OUTPATIENT)
Dept: CARDIAC REHAB | Facility: HOSPITAL | Age: 85
End: 2025-02-06
Attending: INTERNAL MEDICINE
Payer: MEDICARE

## 2025-02-07 ENCOUNTER — LAB ENCOUNTER (OUTPATIENT)
Dept: LAB | Facility: HOSPITAL | Age: 85
End: 2025-02-07
Attending: INTERNAL MEDICINE
Payer: MEDICARE

## 2025-02-07 ENCOUNTER — CARDPULM VISIT (OUTPATIENT)
Dept: CARDIAC REHAB | Facility: HOSPITAL | Age: 85
End: 2025-02-07
Attending: INTERNAL MEDICINE
Payer: MEDICARE

## 2025-02-07 DIAGNOSIS — Z79.01 CURRENT USE OF ANTICOAGULANT THERAPY: Primary | ICD-10-CM

## 2025-02-07 DIAGNOSIS — I10 PRIMARY HYPERTENSION: ICD-10-CM

## 2025-02-07 LAB
ALBUMIN SERPL-MCNC: 4.5 G/DL (ref 3.2–4.8)
ALBUMIN/GLOB SERPL: 1.7 {RATIO} (ref 1–2)
ALP LIVER SERPL-CCNC: 99 U/L
ALT SERPL-CCNC: 16 U/L
ANION GAP SERPL CALC-SCNC: 10 MMOL/L (ref 0–18)
AST SERPL-CCNC: 24 U/L (ref ?–34)
BASOPHILS # BLD AUTO: 0.05 X10(3) UL (ref 0–0.2)
BASOPHILS NFR BLD AUTO: 0.6 %
BILIRUB SERPL-MCNC: 0.6 MG/DL (ref 0.2–1.1)
BUN BLD-MCNC: 16 MG/DL (ref 9–23)
BUN/CREAT SERPL: 14 (ref 10–20)
CALCIUM BLD-MCNC: 8.9 MG/DL (ref 8.7–10.4)
CHLORIDE SERPL-SCNC: 104 MMOL/L (ref 98–112)
CO2 SERPL-SCNC: 25 MMOL/L (ref 21–32)
CREAT BLD-MCNC: 1.14 MG/DL
DEPRECATED RDW RBC AUTO: 44.7 FL (ref 35.1–46.3)
EGFRCR SERPLBLD CKD-EPI 2021: 48 ML/MIN/1.73M2 (ref 60–?)
EOSINOPHIL # BLD AUTO: 0.1 X10(3) UL (ref 0–0.7)
EOSINOPHIL NFR BLD AUTO: 1.2 %
ERYTHROCYTE [DISTWIDTH] IN BLOOD BY AUTOMATED COUNT: 15.8 % (ref 11–15)
FASTING STATUS PATIENT QL REPORTED: NO
GLOBULIN PLAS-MCNC: 2.6 G/DL (ref 2–3.5)
GLUCOSE BLD-MCNC: 77 MG/DL (ref 70–99)
HCT VFR BLD AUTO: 39.5 %
HGB BLD-MCNC: 12.2 G/DL
IMM GRANULOCYTES # BLD AUTO: 0.03 X10(3) UL (ref 0–1)
IMM GRANULOCYTES NFR BLD: 0.3 %
LYMPHOCYTES # BLD AUTO: 1.97 X10(3) UL (ref 1–4)
LYMPHOCYTES NFR BLD AUTO: 23 %
MCH RBC QN AUTO: 24.3 PG (ref 26–34)
MCHC RBC AUTO-ENTMCNC: 30.9 G/DL (ref 31–37)
MCV RBC AUTO: 78.5 FL
MONOCYTES # BLD AUTO: 0.83 X10(3) UL (ref 0.1–1)
MONOCYTES NFR BLD AUTO: 9.7 %
NEUTROPHILS # BLD AUTO: 5.6 X10 (3) UL (ref 1.5–7.7)
NEUTROPHILS # BLD AUTO: 5.6 X10(3) UL (ref 1.5–7.7)
NEUTROPHILS NFR BLD AUTO: 65.2 %
OSMOLALITY SERPL CALC.SUM OF ELEC: 288 MOSM/KG (ref 275–295)
PLATELET # BLD AUTO: 267 10(3)UL (ref 150–450)
POTASSIUM SERPL-SCNC: 4.2 MMOL/L (ref 3.5–5.1)
PROT SERPL-MCNC: 7.1 G/DL (ref 5.7–8.2)
RBC # BLD AUTO: 5.03 X10(6)UL
SODIUM SERPL-SCNC: 139 MMOL/L (ref 136–145)
WBC # BLD AUTO: 8.6 X10(3) UL (ref 4–11)

## 2025-02-07 PROCEDURE — 80053 COMPREHEN METABOLIC PANEL: CPT

## 2025-02-07 PROCEDURE — 36415 COLL VENOUS BLD VENIPUNCTURE: CPT

## 2025-02-07 PROCEDURE — 93798 PHYS/QHP OP CAR RHAB W/ECG: CPT

## 2025-02-07 PROCEDURE — 85025 COMPLETE CBC W/AUTO DIFF WBC: CPT

## 2025-02-10 ENCOUNTER — CARDPULM VISIT (OUTPATIENT)
Dept: CARDIAC REHAB | Facility: HOSPITAL | Age: 85
End: 2025-02-10
Attending: INTERNAL MEDICINE
Payer: MEDICARE

## 2025-02-10 PROCEDURE — 93798 PHYS/QHP OP CAR RHAB W/ECG: CPT

## 2025-02-11 ENCOUNTER — APPOINTMENT (OUTPATIENT)
Dept: CARDIAC REHAB | Facility: HOSPITAL | Age: 85
End: 2025-02-11
Attending: INTERNAL MEDICINE
Payer: MEDICARE

## 2025-02-12 ENCOUNTER — CARDPULM VISIT (OUTPATIENT)
Dept: CARDIAC REHAB | Facility: HOSPITAL | Age: 85
End: 2025-02-12
Attending: INTERNAL MEDICINE
Payer: MEDICARE

## 2025-02-12 PROCEDURE — 93798 PHYS/QHP OP CAR RHAB W/ECG: CPT

## 2025-02-13 ENCOUNTER — APPOINTMENT (OUTPATIENT)
Dept: CARDIAC REHAB | Facility: HOSPITAL | Age: 85
End: 2025-02-13
Attending: INTERNAL MEDICINE
Payer: MEDICARE

## 2025-02-14 ENCOUNTER — CARDPULM VISIT (OUTPATIENT)
Dept: CARDIAC REHAB | Facility: HOSPITAL | Age: 85
End: 2025-02-14
Attending: INTERNAL MEDICINE
Payer: MEDICARE

## 2025-02-14 PROCEDURE — 93798 PHYS/QHP OP CAR RHAB W/ECG: CPT

## 2025-02-17 ENCOUNTER — CARDPULM VISIT (OUTPATIENT)
Dept: CARDIAC REHAB | Facility: HOSPITAL | Age: 85
End: 2025-02-17
Attending: INTERNAL MEDICINE
Payer: MEDICARE

## 2025-02-17 PROCEDURE — 93798 PHYS/QHP OP CAR RHAB W/ECG: CPT

## 2025-02-18 ENCOUNTER — APPOINTMENT (OUTPATIENT)
Dept: CARDIAC REHAB | Facility: HOSPITAL | Age: 85
End: 2025-02-18
Attending: INTERNAL MEDICINE
Payer: MEDICARE

## 2025-02-19 ENCOUNTER — CARDPULM VISIT (OUTPATIENT)
Dept: CARDIAC REHAB | Facility: HOSPITAL | Age: 85
End: 2025-02-19
Attending: INTERNAL MEDICINE
Payer: MEDICARE

## 2025-02-19 PROCEDURE — 93798 PHYS/QHP OP CAR RHAB W/ECG: CPT

## 2025-02-20 ENCOUNTER — APPOINTMENT (OUTPATIENT)
Dept: CARDIAC REHAB | Facility: HOSPITAL | Age: 85
End: 2025-02-20
Attending: INTERNAL MEDICINE
Payer: MEDICARE

## 2025-02-24 ENCOUNTER — CARDPULM VISIT (OUTPATIENT)
Dept: CARDIAC REHAB | Facility: HOSPITAL | Age: 85
End: 2025-02-24
Attending: INTERNAL MEDICINE
Payer: MEDICARE

## 2025-02-24 PROCEDURE — 93798 PHYS/QHP OP CAR RHAB W/ECG: CPT

## 2025-02-25 ENCOUNTER — APPOINTMENT (OUTPATIENT)
Dept: CARDIAC REHAB | Facility: HOSPITAL | Age: 85
End: 2025-02-25
Attending: INTERNAL MEDICINE
Payer: MEDICARE

## 2025-02-26 ENCOUNTER — CARDPULM VISIT (OUTPATIENT)
Dept: CARDIAC REHAB | Facility: HOSPITAL | Age: 85
End: 2025-02-26
Attending: INTERNAL MEDICINE
Payer: MEDICARE

## 2025-02-26 PROCEDURE — 93798 PHYS/QHP OP CAR RHAB W/ECG: CPT

## 2025-02-27 ENCOUNTER — APPOINTMENT (OUTPATIENT)
Dept: CARDIAC REHAB | Facility: HOSPITAL | Age: 85
End: 2025-02-27
Attending: INTERNAL MEDICINE
Payer: MEDICARE

## 2025-02-28 ENCOUNTER — CARDPULM VISIT (OUTPATIENT)
Dept: CARDIAC REHAB | Facility: HOSPITAL | Age: 85
End: 2025-02-28
Attending: INTERNAL MEDICINE
Payer: MEDICARE

## 2025-02-28 PROCEDURE — 93798 PHYS/QHP OP CAR RHAB W/ECG: CPT

## 2025-03-04 ENCOUNTER — APPOINTMENT (OUTPATIENT)
Dept: CARDIAC REHAB | Facility: HOSPITAL | Age: 85
End: 2025-03-04
Attending: INTERNAL MEDICINE
Payer: MEDICARE

## 2025-03-05 ENCOUNTER — CARDPULM VISIT (OUTPATIENT)
Dept: CARDIAC REHAB | Facility: HOSPITAL | Age: 85
End: 2025-03-05
Attending: INTERNAL MEDICINE
Payer: MEDICARE

## 2025-03-05 PROCEDURE — 93798 PHYS/QHP OP CAR RHAB W/ECG: CPT

## 2025-03-06 ENCOUNTER — APPOINTMENT (OUTPATIENT)
Dept: CARDIAC REHAB | Facility: HOSPITAL | Age: 85
End: 2025-03-06
Attending: INTERNAL MEDICINE
Payer: MEDICARE

## 2025-03-07 ENCOUNTER — CARDPULM VISIT (OUTPATIENT)
Dept: CARDIAC REHAB | Facility: HOSPITAL | Age: 85
End: 2025-03-07
Attending: INTERNAL MEDICINE
Payer: MEDICARE

## 2025-03-07 PROCEDURE — 93798 PHYS/QHP OP CAR RHAB W/ECG: CPT

## 2025-03-10 ENCOUNTER — APPOINTMENT (OUTPATIENT)
Dept: CARDIAC REHAB | Facility: HOSPITAL | Age: 85
End: 2025-03-10
Attending: INTERNAL MEDICINE
Payer: MEDICARE

## 2025-03-12 ENCOUNTER — CARDPULM VISIT (OUTPATIENT)
Dept: CARDIAC REHAB | Facility: HOSPITAL | Age: 85
End: 2025-03-12
Attending: INTERNAL MEDICINE
Payer: MEDICARE

## 2025-03-12 PROCEDURE — 93798 PHYS/QHP OP CAR RHAB W/ECG: CPT

## 2025-03-14 ENCOUNTER — CARDPULM VISIT (OUTPATIENT)
Dept: CARDIAC REHAB | Facility: HOSPITAL | Age: 85
End: 2025-03-14
Attending: INTERNAL MEDICINE
Payer: MEDICARE

## 2025-03-14 PROCEDURE — 93798 PHYS/QHP OP CAR RHAB W/ECG: CPT

## 2025-03-17 ENCOUNTER — CARDPULM VISIT (OUTPATIENT)
Dept: CARDIAC REHAB | Facility: HOSPITAL | Age: 85
End: 2025-03-17
Attending: INTERNAL MEDICINE
Payer: MEDICARE

## 2025-03-17 PROCEDURE — 93798 PHYS/QHP OP CAR RHAB W/ECG: CPT

## 2025-03-19 ENCOUNTER — CARDPULM VISIT (OUTPATIENT)
Dept: CARDIAC REHAB | Facility: HOSPITAL | Age: 85
End: 2025-03-19
Attending: INTERNAL MEDICINE
Payer: MEDICARE

## 2025-03-19 PROCEDURE — 93798 PHYS/QHP OP CAR RHAB W/ECG: CPT

## 2025-03-21 ENCOUNTER — CARDPULM VISIT (OUTPATIENT)
Dept: CARDIAC REHAB | Facility: HOSPITAL | Age: 85
End: 2025-03-21
Attending: INTERNAL MEDICINE
Payer: MEDICARE

## 2025-03-21 PROCEDURE — 93798 PHYS/QHP OP CAR RHAB W/ECG: CPT

## 2025-03-24 ENCOUNTER — CARDPULM VISIT (OUTPATIENT)
Dept: CARDIAC REHAB | Facility: HOSPITAL | Age: 85
End: 2025-03-24
Attending: INTERNAL MEDICINE
Payer: MEDICARE

## 2025-03-24 PROCEDURE — 93798 PHYS/QHP OP CAR RHAB W/ECG: CPT

## 2025-03-26 ENCOUNTER — APPOINTMENT (OUTPATIENT)
Dept: CARDIAC REHAB | Facility: HOSPITAL | Age: 85
End: 2025-03-26
Attending: INTERNAL MEDICINE
Payer: MEDICARE

## 2025-03-28 ENCOUNTER — APPOINTMENT (OUTPATIENT)
Dept: CARDIAC REHAB | Facility: HOSPITAL | Age: 85
End: 2025-03-28
Attending: INTERNAL MEDICINE
Payer: MEDICARE

## 2025-03-31 ENCOUNTER — APPOINTMENT (OUTPATIENT)
Dept: CARDIAC REHAB | Facility: HOSPITAL | Age: 85
End: 2025-03-31
Attending: INTERNAL MEDICINE
Payer: MEDICARE

## 2025-04-02 ENCOUNTER — APPOINTMENT (OUTPATIENT)
Dept: CARDIAC REHAB | Facility: HOSPITAL | Age: 85
End: 2025-04-02
Attending: INTERNAL MEDICINE
Payer: MEDICARE

## 2025-04-03 ENCOUNTER — APPOINTMENT (OUTPATIENT)
Dept: OBGYN | Age: 85
End: 2025-04-03

## 2025-04-03 VITALS
SYSTOLIC BLOOD PRESSURE: 114 MMHG | HEIGHT: 65 IN | BODY MASS INDEX: 30.72 KG/M2 | WEIGHT: 184.4 LBS | TEMPERATURE: 98 F | DIASTOLIC BLOOD PRESSURE: 78 MMHG | HEART RATE: 70 BPM | OXYGEN SATURATION: 98 %

## 2025-04-03 DIAGNOSIS — M85.852 OSTEOPENIA OF NECK OF LEFT FEMUR: ICD-10-CM

## 2025-04-03 DIAGNOSIS — Z12.31 SCREENING MAMMOGRAM FOR BREAST CANCER: ICD-10-CM

## 2025-04-03 DIAGNOSIS — N90.4 LICHEN SCLEROSUS ET ATROPHICUS OF THE VULVA: Primary | ICD-10-CM

## 2025-04-03 DIAGNOSIS — Z78.0 POSTMENOPAUSAL STATUS, AGE-RELATED: ICD-10-CM

## 2025-04-03 RX ORDER — CALCIUM CARBONATE/VITAMIN D3 500 MG-10
TABLET ORAL
COMMUNITY
Start: 2024-09-19

## 2025-04-03 RX ORDER — CLOPIDOGREL BISULFATE 75 MG
TABLET ORAL
COMMUNITY
Start: 2025-02-13

## 2025-04-03 RX ORDER — METOPROLOL TARTRATE 25 MG/1
25 TABLET, FILM COATED ORAL 2 TIMES DAILY
COMMUNITY

## 2025-04-03 RX ORDER — CLOBETASOL PROPIONATE 0.5 MG/G
OINTMENT TOPICAL
Qty: 30 G | Refills: 3 | Status: SHIPPED | OUTPATIENT
Start: 2025-04-03

## 2025-04-03 RX ORDER — APIXABAN 5 MG/1
5 TABLET, FILM COATED ORAL 2 TIMES DAILY
COMMUNITY

## 2025-04-03 RX ORDER — PRAVASTATIN SODIUM 20 MG
TABLET ORAL
COMMUNITY
Start: 2024-11-20

## 2025-06-18 ENCOUNTER — HOSPITAL ENCOUNTER (OUTPATIENT)
Dept: BONE DENSITY | Age: 85
Discharge: HOME | End: 2025-06-18
Attending: OBSTETRICS & GYNECOLOGY
Payer: MEDICARE

## 2025-06-18 DIAGNOSIS — Z78.0 MENOPAUSE: ICD-10-CM

## 2026-04-08 ENCOUNTER — APPOINTMENT (OUTPATIENT)
Dept: OBGYN | Age: 86
End: 2026-04-08

## (undated) NOTE — LETTER
11/20/2024          To Whom It May Concern:    Jeannette Zhang is currently under my medical care and may not return to cardiac rehabilitation at this time. She may return to cardiac rehabilitation on 12/03/24.      If you require additional information please contact our office.        Sincerely,    Chris Ruiz MD

## (undated) NOTE — ED AVS SNAPSHOT
Beth Arreguin   MRN: [id number]    Department:  [facility] Emergency Department   Date of Visit:  1/4/2020           Disclosure     Insurance plans vary and the physician(s) referred by the ER may not be covered by your plan.  Please contact within the next three months to obtain basic health screening including reassessment of your blood pressure.     IF THERE IS ANY CHANGE OR WORSENING OF YOUR CONDITION, CALL YOUR PRIMARY CARE PHYSICIAN AT ONCE OR RETURN IMMEDIATELY TO THE EMERGENCY DEPARTMEN

## (undated) NOTE — LETTER
Hospital Discharge Documentation  Please phone to schedule a hospital follow up appointment.    From: [facility] Hospitalist's Office  Phone: [phone]    Patient discharged time/date: 2024  2:17 PM  Patient discharge disposition:  Home or Self Care       Discharge Summary - D/C Summary        Discharge Summary signed by Roni Rosas MD at 2024 12:59 PM  Version 1 of 1      Author: Roni Rosas MD Service: Hospitalist Author Type: Physician    Filed: 2024 12:59 PM Date of Service: 2024 12:57 PM Status: Signed    : Roni Rosas MD (Physician)         [facility]  part of [facility]     Discharge Summary    Jeannette Zhang Patient Status:  Inpatient    10/10/1940 MRN [id number]   Location [facility] 3W/SW Attending Roni Rosas MD   Hosp Day # 3 PCP Harpreet Pratt MD     Date of Admission: 2024    Date of Discharge: 2024    Admitting Diagnosis: NSTEMI (non-ST elevated myocardial infarction) ([facility]) [I21.4]  Atrial fibrillation and flutter ([facility]) [I48.91, I48.92]    Discharge Diagnosis:   Patient Active Problem List   Diagnosis    NSTEMI (non-ST elevated myocardial infarction) ([facility])    Atrial fibrillation and flutter ([facility])       Reason for Admission:     NSTEMI    Physical Exam:     General: Patient is alert and oriented x3 does not appear to be in acute distress at this time  HEENT: EOMI PERRLA, atraumatic normocephalic  Cardiac: S1-S2 appreciated  Lungs: Good air entry bilaterally clear to auscultation  Abdomen: Soft nontender nondistended positive bowel sounds  Ext: Peripheral pulses are positive  Neuro: No focal deficits noted  Psych: Normal mood  Skin: No rashes noted  MSK: Full range of motion intact      Hospital Course:     Chest pain  NSTEMI/elevated troponin  New onset atrial fibrillation, rate controlled  -Admit  -Continue heparin drip per ACS protocol  -Continue to trend troponin  -TSH with reflex  -Significant  troponin elevation  -Monitor on telemetry  -Echocardiogram  -Cardiology consulted and following     Atrial fibrillation  -New diagnosis  -Control rates currently on no meds  -Will continue to monitor on telemetry  -Currently on IV heparin for the above     Ascending aortic aneurysm  -Patient had a CTA chest which showed a 4.5 cm ascending aortic aneurysm  -Currently without dissection  -Patient will need to follow-up with outpatient.     HTN  -Home medications  -Hydralazine as needed     HLD  -Statins     Ascending aortic aneurysm  -Follow-up 12 months recommended by radiology discussed with patient      PATIENT S/P PCI STENT PLACEMENT TO THE LAD  PATIENT TO BE DC ON PLAVIX AND XARELTO  F/U WITH PCP AND CARDS   D/W PATIENT IN DETAIL      History of Present Illness:     PER ADMITTING;   Jeannette Zhang is a 83 year old female with history of HTN, HLD, PMR who presented to the ED with complaints of chest pain.  Onset about 10 AM today while at rest.  Associated with mild difficulty in breathing.  She initially attributed pain to lesion on chest that was 'frozen' yesterday.  Pain persisted through the day, not exacerbated by activity.  Due to persistent, patient presented to the ED for further evaluation.  EKG showed rate controlled atrial fibrillation with nonspecific T wave abnormalities.  Labs significant for troponin 916  D-dimer 1.03  CTA chest: No PE.  4.5 cm ascending aortic aneurysm.  Mild interstitial edema.  Dilated main pulmonary artery as can be seen with pulmonary HTN.    Disposition: Home or Self Care    Discharge Condition:   STABLE    Discharge Medications:   Current Discharge Medication List        START taking these medications    Details   metoprolol tartrate 50 MG Oral Tab Take 1 tablet (50 mg total) by mouth 2x Daily(Beta Blocker).  Qty: 60 tablet, Refills: 1      hydroCHLOROthiazide 12.5 MG Oral Tab Take 0.5 tablets (6.25 mg total) by mouth Daily Beta Blocker.  Qty: 30 tablet, Refills: 0       clopidogrel 75 MG Oral Tab Take 1 tablet (75 mg total) by mouth daily.  Qty: 30 tablet, Refills: 1      nitroglycerin 0.4 MG Sublingual SL Tab Place 1 tablet (0.4 mg total) under the tongue every 5 (five) minutes as needed for Chest pain.  Qty: 30 tablet, Refills: 0      pravastatin 20 MG Oral Tab Take 1 tablet (20 mg total) by mouth nightly.  Qty: 30 tablet, Refills: 1      rivaroxaban 20 MG Oral Tab Take 1 tablet (20 mg total) by mouth nightly.  Qty: 30 tablet, Refills: 1           CONTINUE these medications which have CHANGED    Details   amLODIPine 10 MG Oral Tab Take 1 tablet (10 mg total) by mouth daily.  Qty: 30 tablet, Refills: 1           CONTINUE these medications which have NOT CHANGED    Details   Cholecalciferol 50 MCG (2000 UT) Oral Tab Take 1 tablet (2,000 Units total) by mouth daily.      Calcium Carb-Cholecalciferol 500-10 MG-MCG Oral Tab Take 1 tablet by mouth daily.      Multiple Vitamin (MULTIVITAMINS OR) Take 1 tablet by mouth daily.      LOSARTAN 100 MG Oral Tab TAKE ONE TABLET BY MOUTH ONCE DAILY  Qty: 90 tablet, Refills: 0           STOP taking these medications       BISOPROLOL-HYDROCHLOROTHIAZIDE 5-6.25 MG Oral Tab              TOTAL DC TIME > 30 MIN    Roni Rosas MD  9/17/2024  12:57 PM     Hospital Discharge Diagnoses:  NSTEMI    Lace+ Score: 56  59-90 High Risk  29-58 Medium Risk  0-28   Low Risk.    TCM Follow-Up Recommendation:  LACE > 58: High Risk of readmission after discharge from the hospital.       Electronically signed by Roni Rosas MD on 9/17/2024 12:59 PM

## (undated) NOTE — LETTER
[place] [address]  Authorization for Invasive Procedures  Date: 09/15/2024           Time: 1352    I hereby authorize Dr. Vizcaino/ Dr. Saez, my physician and his/her assistants (if applicable), which may include medical students, residents, and/or fellows, to perform the following surgical operation/ procedure and administer such anesthesia as may be determined necessary by my physician:  Operation/Procedure name (s)   Cardiac catheterization, left ventricular cineangiography, bilateral selective coronary arteriography and/or right heart catheterization.  Possible percutaneous transluminal coronary angioplasty, coronary atherectomy, coronary stent, intracoronary thrombolytic therapy. on Jeannette Zhang   2.   I recognize that during the surgical operation/procedure, unforeseen conditions may necessitate additional or different procedures than those listed above.  I, therefore, further authorize and request that the above-named surgeon, assistants, or designees perform such procedures as are, in their judgment, necessary and desirable.    3.   My surgeon/physician has discussed prior to my surgery the potential benefits, risks and side effects of this procedure; the likelihood of achieving goals; and potential problems that might occur during recuperation.  They also discussed reasonable alternatives to the procedure, including risks, benefits, and side effects related to the alternatives and risks related to not receiving this procedure.  I have had all my questions answered and I acknowledge that no guarantee has been made as to the result that may be obtained.    4.   Should the need arise during my operation/procedure, which includes change of level of care prior to discharge, I also consent to the administration of blood and/or blood products.  Further, I understand that despite careful testing and screening of blood or blood products by collecting agencies, I may still be  subject to ill effects as a result of receiving a blood transfusion and/or blood products.  The following are some, but not all, of the potential risks that can occur: fever and allergic reactions, hemolytic reactions, transmission of diseases such as Hepatitis, AIDS and Cytomegalovirus (CMV) and fluid overload.  In the event that I wish to have an autologous transfusion of my own blood, or a directed donor transfusion, I will discuss this with my physician.  Check only if Refusing Blood or Blood Products  I understand refusal of blood or blood products as deemed necessary by my physician may have serious consequences to my condition to include possible death. I hereby assume responsibility for my refusal and release the hospital, its personnel, and my physicians from any responsibility for the consequences of my refusal.          o  Refuse      5.   I authorize the use of any specimen, organs, tissues, body parts or foreign objects that may be removed from my body during the operation/procedure for diagnosis, research or teaching purposes and their subsequent disposal by hospital authorities.  I also authorize the release of specimen test results and/or written reports to my treating physician on the hospital medical staff or other referring or consulting physicians involved in my care, at the discretion of the Pathologist or my treating physician.    6.   I consent to the photographing or videotaping of the operations or procedures to be performed, including appropriate portions of my body for medical, scientific, or educational purposes, provided my identity is not revealed by the pictures or by descriptive texts accompanying them.  If the procedure has been photographed/videotaped, the surgeon will obtain the original picture, image, videotape or CD.  The hospital will not be responsible for storage, release or maintenance of the picture, image, tape or CD.    7.   I consent to the presence of a product  specialist or observers in the operating room as deemed necessary by my physician or their designees.    8.   I recognize that in the event my procedure results in extended X-Ray/fluoroscopy time, I may develop a skin reaction.    9. If I have a Do Not Attempt Resuscitation (DNAR) order in place, that status will be suspended while in the operating room, procedural suite, and during the recovery period unless otherwise explicitly stated by me (or a person authorized to consent on my behalf). The surgeon or my attending physician will determine when the applicable recovery period ends for purposes of reinstating the DNAR order.  10. Patients having a sterilization procedure: I understand that if the procedure is successful the results will be permanent and it will therefore be impossible for me to inseminate, conceive, or bear children.  I also understand that the procedure is intended to result in sterility, although the result has not been guaranteed.   11. I acknowledge that my physician has explained sedation/analgesia administration to me including the risk and benefits I consent to the administration of sedation/analgesia as may be necessary or desirable in the judgment of my physician.    I CERTIFY THAT I HAVE READ AND FULLY UNDERSTAND THE ABOVE CONSENT TO OPERATION and/or OTHER PROCEDURE.        ____________________________________       _________________________________      ______________________________  Signature of Patient         Signature of Responsible Person        Printed Name of Responsible Person    ____________________________________      _________________________________      ______________________________       Signature of Witness          Relationship to Patient                       Date                                       Time  Patient Name: Jeannette SALAZAR Leatha  : 10/10/1940    Reviewed: 2024   Printed: September 15, 2024  Medical Record #: [id number] Page 1 of 2            STATEMENT OF PHYSICIAN My signature below affirms that prior to the time of the procedure; I have explained to the patient and/or his/her legal representative, the risks and benefits involved in the proposed treatment and any reasonable alternative to the proposed treatment. I have also explained the risks and benefits involved in refusal of the proposed treatment and alternatives to the proposed treatment and have answered the patient's questions. If I have a significant financial interest in a co-management agreement or a significant financial interest in any product or implant, or other significant relationship used in this procedure/surgery, I have disclosed this and had a discussion with my patient.     _______________________________________________________________ _____________________________  (Signature of Physician)                                                                                         (Date)                                   (Time)  Patient Name: Jeannette Zhang  : 10/10/1940    Reviewed: 2024   Printed: September 15, 2024  Medical Record #: [id number] Page 2 of 2